# Patient Record
Sex: FEMALE | Race: WHITE | Employment: OTHER | URBAN - METROPOLITAN AREA
[De-identification: names, ages, dates, MRNs, and addresses within clinical notes are randomized per-mention and may not be internally consistent; named-entity substitution may affect disease eponyms.]

---

## 2017-08-07 PROBLEM — I74.2 RADIAL ARTERY THROMBOSIS (HCC): Status: ACTIVE | Noted: 2017-08-07

## 2017-08-10 ENCOUNTER — TELEPHONE (OUTPATIENT)
Dept: CARDIOLOGY CLINIC | Age: 57
End: 2017-08-10

## 2017-08-10 ENCOUNTER — OFFICE VISIT (OUTPATIENT)
Dept: CARDIOLOGY CLINIC | Age: 57
End: 2017-08-10

## 2017-08-10 VITALS
DIASTOLIC BLOOD PRESSURE: 84 MMHG | SYSTOLIC BLOOD PRESSURE: 124 MMHG | BODY MASS INDEX: 29.98 KG/M2 | WEIGHT: 191 LBS | HEART RATE: 88 BPM | HEIGHT: 67 IN

## 2017-08-10 DIAGNOSIS — I35.0 SEVERE AORTIC STENOSIS: Primary | ICD-10-CM

## 2017-08-10 PROCEDURE — 99214 OFFICE O/P EST MOD 30 MIN: CPT | Performed by: INTERNAL MEDICINE

## 2017-08-15 ENCOUNTER — HOSPITAL ENCOUNTER (OUTPATIENT)
Dept: WOMENS IMAGING | Age: 57
Discharge: HOME | End: 2017-08-15
Attending: INTERNAL MEDICINE | Admitting: INTERNAL MEDICINE

## 2017-08-15 DIAGNOSIS — Z12.31 VISIT FOR SCREENING MAMMOGRAM: ICD-10-CM

## 2017-08-24 ENCOUNTER — TELEPHONE (OUTPATIENT)
Dept: CARDIOLOGY CLINIC | Age: 57
End: 2017-08-24

## 2017-08-24 DIAGNOSIS — I35.0 SEVERE AORTIC STENOSIS: Primary | ICD-10-CM

## 2017-08-28 LAB
BASOPHILS ABSOLUTE: NORMAL /ΜL
BASOPHILS RELATIVE PERCENT: NORMAL %
BUN BLDV-MCNC: 11 MG/DL
CALCIUM SERPL-MCNC: 9.5 MG/DL
CHLORIDE BLD-SCNC: 101 MMOL/L
CO2: 24 MMOL/L
CREAT SERPL-MCNC: 0.8 MG/DL
EOSINOPHILS ABSOLUTE: NORMAL /ΜL
EOSINOPHILS RELATIVE PERCENT: NORMAL %
GFR CALCULATED: NORMAL
GLUCOSE BLD-MCNC: NORMAL MG/DL
HCT VFR BLD CALC: 39.6 % (ref 36–46)
HEMOGLOBIN: 13.6 G/DL (ref 12–16)
LYMPHOCYTES ABSOLUTE: NORMAL /ΜL
LYMPHOCYTES RELATIVE PERCENT: NORMAL %
MCH RBC QN AUTO: 31.6 PG
MCHC RBC AUTO-ENTMCNC: 34.3 G/DL
MCV RBC AUTO: 92.1 FL
MONOCYTES ABSOLUTE: NORMAL /ΜL
MONOCYTES RELATIVE PERCENT: NORMAL %
NEUTROPHILS ABSOLUTE: NORMAL /ΜL
NEUTROPHILS RELATIVE PERCENT: NORMAL %
PLATELET # BLD: 212 K/ΜL
PMV BLD AUTO: 10.3 FL
POTASSIUM SERPL-SCNC: 4.3 MMOL/L
RBC # BLD: 4.3 10^6/ΜL
SODIUM BLD-SCNC: 142 MMOL/L
WBC # BLD: 5.13 10^3/ML

## 2017-09-28 ENCOUNTER — TELEPHONE (OUTPATIENT)
Dept: CARDIOLOGY CLINIC | Age: 57
End: 2017-09-28

## 2017-09-28 DIAGNOSIS — Z95.2 S/P TAVR (TRANSCATHETER AORTIC VALVE REPLACEMENT): Primary | ICD-10-CM

## 2017-09-28 NOTE — TELEPHONE ENCOUNTER
Spoke with patient, she is scheduled for F/U from recent TAVR at [facility] with Dr. Josh Aldrich on 10/20/17. We will schedule patient to have follow up Echo and Labwork at that time.

## 2017-10-05 ENCOUNTER — OFFICE VISIT (OUTPATIENT)
Dept: CARDIOLOGY CLINIC | Age: 57
End: 2017-10-05

## 2017-10-05 VITALS
HEART RATE: 74 BPM | SYSTOLIC BLOOD PRESSURE: 126 MMHG | BODY MASS INDEX: 30.64 KG/M2 | WEIGHT: 195.2 LBS | HEIGHT: 67 IN | DIASTOLIC BLOOD PRESSURE: 82 MMHG

## 2017-10-05 DIAGNOSIS — Z98.890 POST-OPERATIVE STATE: Primary | ICD-10-CM

## 2017-10-05 PROCEDURE — 93000 ELECTROCARDIOGRAM COMPLETE: CPT | Performed by: INTERNAL MEDICINE

## 2017-10-05 PROCEDURE — 99214 OFFICE O/P EST MOD 30 MIN: CPT | Performed by: INTERNAL MEDICINE

## 2017-10-05 NOTE — MR AVS SNAPSHOT
After Visit Summary             Andrew Malhotra   10/5/2017 3:40 PM   Office Visit    Description:  Female : 1960   Provider:  Jacqueline Giron MD   Department:  Cardiology [address] and Future Appointments         Below is a list of your follow-up and future appointments. This may not be a complete list as you may have made appointments directly with providers that we are not aware of or your providers may have made some for you. Please call your providers to confirm appointments. It is important to keep your appointments. Please bring your current insurance card, photo ID, co-pay, and all medication bottles to your appointment. If self-pay, payment is expected at the time of service. Your To-Do List     Future Appointments Provider Department Dept Phone    2017 11:10 AM Tish Nam MD Cardiology [facility] [phone]    Please arrive 15 minutes prior to appointment, bring photo ID and insurance card. 2018 10:40 AM Jacqueline Giron MD Cardiology [facility] 104 [phone]    Please arrive 15 minutes prior to appointment, bring photo ID and insurance card. Standing Orders Interval Expires    EKG 12 Lead [EKG1 Custom]  Every 6 Months until 10/5/2018 10/5/2018    Follow-Up    Return in about 6 months (around 2018). Information from Your Visit        Department     Name Address Phone Fax    Cardiology [facility] [address] [address] [phone]      You Were Seen for:         Comments    Post-operative state   [619561]         Vital Signs     Blood Pressure Pulse Height Weight Body Mass Index Smoking Status    126/82 74 5' 7\" (1.702 m) 195 lb 3.2 oz (88.5 kg) 30.57 kg/m2 Current Every Day Smoker      Additional Information about your Body Mass Index (BMI)           Your BMI as listed above is considered obese (30 or more).  BMI is an estimate of body fat, calculated from your height and weight. The higher your BMI, the greater your risk of heart disease, high blood pressure, type 2 diabetes, stroke, gallstones, arthritis, sleep apnea, and certain cancers. BMI is not perfect. It may overestimate body fat in athletes and people who are more muscular. Even a small weight loss (between 5 and 10 percent of your current weight) by decreasing your calorie intake and becoming more physically active will help lower your risk of developing or worsening diseases associated with obesity. Learn more at: [employer]             Medications and Orders      Your Current Medications Are              apixaban (ELIQUIS) 5 MG TABS tablet Take 2 tablets by mouth 2 times daily for 7 days Take 2 tablets by mouth twice a day for 6 days, take 1 tablet by mouth twice a day for the next 5 days    amiodarone (CORDARONE) 200 MG tablet Take 200 mg by mouth 2 times daily    clopidogrel (PLAVIX) 75 MG tablet Take 75 mg by mouth daily    oxyCODONE-acetaminophen (PERCOCET)  MG per tablet Take 1 tablet by mouth every 4 hours as needed for Pain .    metoprolol succinate (TOPROL XL) 25 MG extended release tablet Take 0.5 tablets by mouth daily    GABAPENTIN PO Take 300 mg by mouth nightly     acetaminophen (TYLENOL) 325 MG tablet Take 650 mg by mouth every 6 hours as needed for Pain    mirtazapine (REMERON) 30 MG tablet Take 30 mg by mouth nightly    nicotine (NICODERM CQ) 21 MG/24HR Place 1 patch onto the skin every 24 hours    promethazine (PHENERGAN) 25 MG tablet Take 1 tablet by mouth every 6 hours as needed for Nausea for 15 doses. aspirin 81 MG EC tablet Take 1 tablet by mouth daily    imatinib (GLEEVEC) 400 MG tablet Take 400 mg by mouth daily. Allergies              Ampicillin Other (See Comments)    Vein start to burn and swelling.  Entire arm     Bee Venom Anaphylaxis    Penicillins Swelling, Rash Typhoid Vaccines Other (See Comments)    Pt cannot remember     Codeine Nausea And Vomiting    Morphine Nausea And Vomiting    Tape Libby Cake Tape] Rash    Vicodin [Hydrocodone-acetaminophen] Nausea And Vomiting      We Ordered/Performed the following           EKG 12 Lead          Result Summary for EKG 12 Lead      Result Information     Status          Final result (Resulted: 10/5/2017  3:35 PM)           10/5/2017  3:38 PM      Scans on Order [id number]            ECG on 10/5/2017  3:38 PM : ECG Report                     Additional Information        Basic Information     Date Of Birth Sex Race Ethnicity Preferred Language    1960 Female White Non-/Non  English      Problem List as of 10/5/2017  Date Reviewed: 2/19/2016                Right radial artery thrombus (HCC)    Chest pain    Rheumatic aortic stenosis    Radial artery thrombosis (HCC)    Pneumothorax    Pneumonia    Nausea & vomiting    Abdominal pain    CML (chronic myeloid leukemia) ([facility] [address]      Immunizations as of 10/5/2017     Name Date    Pneumococcal Polysaccharide ([id number]) 4/15/2014      Preventive Care        Date Due    Hepatitis C screening is recommended for all adults regardless of risk factors born between [facility] at least once (lifetime) who have never been tested. 1960    HIV screening is recommended for all people regardless of risk factors  aged 15-65 years at least once (lifetime) who have never been HIV tested.  12/4/1977    Tetanus Combination Vaccine (1 - Tdap) 12/4/1981    Pap Smear 12/4/1983    Cholesterol Screening 12/4/2002    Colon Cancer Stool Test 9/26/2013    Pneumococcal Vaccines (two) for adults aged 19-64  years who are immunocompromised or whose spleen is missing or not working  (2 of 3 - PCV13) 4/15/2015    Yearly Flu Vaccine (1) 9/1/2017    Diabetes Screening 2/14/2019    Mammograms are recommended every 2 years for low/average risk patients aged 48 - 69, and every year for high risk patients per updated national guidelines. However these guidelines can be individualized by your provider. 8/15/2019            MyChart Signup           Our records indicate that you have declined MyChart signup.

## 2017-10-05 NOTE — PROGRESS NOTES
Angelica Hutchison MD        OFFICE  FOLLOWUP NOTE    Chief complaints: patient is here for management of  severe AS s/p TAVR, Lukemia, Lung mass s/p resection 2/2015, LEFT fore arm thrombus    Subjective: patient feels better, no chest pain, no shortness of breath, no dizziness, no palpitations    HPI Lamar Alexander is a 49 y. o.year old who  has a past medical history of Aortic valve disease; CML (chronic myelocytic leukemia) ([facility] [address] H/O cardiac catheterization; Leukemia ([facility] [address] Leukemia ([facility] [address] Lung mass; and Ovarian cancer ([facility] [address] and presents for management of severe AS s/p TAVR, Lukemia, Lung mass s/p resection 2/2015, LEFT fore arm thrombuswhich are well controlled      Current Outpatient Prescriptions   Medication Sig Dispense Refill    apixaban (ELIQUIS) 5 MG TABS tablet Take 2 tablets by mouth 2 times daily for 7 days Take 2 tablets by mouth twice a day for 6 days, take 1 tablet by mouth twice a day for the next 5 days 28 tablet 0    amiodarone (CORDARONE) 200 MG tablet Take 200 mg by mouth 2 times daily      clopidogrel (PLAVIX) 75 MG tablet Take 75 mg by mouth daily      oxyCODONE-acetaminophen (PERCOCET)  MG per tablet Take 1 tablet by mouth every 4 hours as needed for Pain .  metoprolol succinate (TOPROL XL) 25 MG extended release tablet Take 0.5 tablets by mouth daily (Patient taking differently: Take 25 mg by mouth daily ) 90 tablet 3    GABAPENTIN PO Take 300 mg by mouth nightly       acetaminophen (TYLENOL) 325 MG tablet Take 650 mg by mouth every 6 hours as needed for Pain      mirtazapine (REMERON) 30 MG tablet Take 30 mg by mouth nightly      nicotine (NICODERM CQ) 21 MG/24HR Place 1 patch onto the skin every 24 hours      promethazine (PHENERGAN) 25 MG tablet Take 1 tablet by mouth every 6 hours as needed for Nausea for 15 doses. 15 tablet 0    aspirin 81 MG EC tablet Take 1 tablet by mouth daily 90 tablet 3    imatinib (GLEEVEC) 400 MG tablet Take 400 mg by mouth daily. No current facility-administered medications for this visit. Allergies: Ampicillin; Bee venom; Penicillins; Typhoid vaccines; Codeine; Morphine; Tape [adhesive tape]; and Vicodin [hydrocodone-acetaminophen]  Past Medical History:   Diagnosis Date    Aortic valve disease     CML (chronic myelocytic leukemia) ([facility] [address]     H/O cardiac catheterization 08/04/2017    normal arteries, severe AS    Leukemia (HCC)     Leukemia ([facility] [address]     Lung mass     Ovarian cancer ([facility]      Past Surgical History:   Procedure Laterality Date    ANOMALOUS VENOUS RETURN REPAIR      BONE MARROW BIOPSY  feb 2012    CHOLECYSTECTOMY      DENTAL SURGERY      HYSTERECTOMY      THORACOTOMY       Family History   Problem Relation Age of Onset    Cancer Mother     Kidney Disease Mother     Heart Disease Maternal Grandmother      Social History   Substance Use Topics    Smoking status: Current Every Day Smoker     Packs/day: 0.25     Years: 38.00    Smokeless tobacco: Never Used    Alcohol use No      [unfilled]  Review of Systems:   · Constitutional: No Fever or Weight Loss   · Eyes: No Decreased Vision  · ENT: No Headaches, Hearing Loss or Vertigo  · Cardiovascular: No chest pain, dyspnea on exertion, palpitations or loss of consciousness  · Respiratory: No cough or wheezing    · Gastrointestinal: No abdominal pain, appetite loss, blood in stools, constipation, diarrhea or heartburn  · Genitourinary: No dysuria, trouble voiding, or hematuria  · Musculoskeletal:  No gait disturbance, weakness or joint complaints  · Integumentary: No rash or pruritis  · Neurological: No TIA or stroke symptoms  · Psychiatric: No anxiety or depression  · Endocrine: No malaise, fatigue or temperature intolerance  · Hematologic/Lymphatic: No bleeding problems, blood clots or swollen lymph nodes  · Allergic/Immunologic: No nasal congestion or hives  All systems negative except as marked.    Objective:  /82  Pulse 74  Ht 5' 7\" (1.702 m)  Wt 09/26/2012     PT/INR:  No results found for: PTINR  No results found for: LABA1C  No results found for: CHOL, TRIG, HDL, LDLCALC, LDLDIRECT  Lab Results   Component Value Date    ALT 36 09/27/2017    AST 24 09/27/2017     TSH:  No results found for: TSH    Ekg: nsr  Impression:  Varun Jorgensen is a 49 y. o.year old who  has a past medical history of Aortic valve disease; CML (chronic myelocytic leukemia) ([facility] [address] H/O cardiac catheterization; Leukemia ([facility] [address] Leukemia ([facility] [address] Lung mass; and Ovarian cancer ([facility] [address] and presents with     Plan:  1. SEVERE AS. S/P TAVR: DOING GREAT, continue to follow up Dr. Sharon Cazares, will need CXR ( hospital records from [place] discussed)  2. Lukemia: on gleevec   3. Lung mass: s/p surgery ( not malignant, she had rt lung mass and had 4 times pneumothorax)  4. left basilic vein thrombophlebitis:continue eliquis, continue warm compression and aspirin  5. Rt femoral artery wound his healing  6. Health maintenance: exerise and diet  All labs, medications and tests reviewed, continue all other medications of all above medical condition listed as is.     [unfilled]

## 2017-10-16 ENCOUNTER — TELEPHONE (OUTPATIENT)
Dept: CARDIOLOGY CLINIC | Age: 57
End: 2017-10-16

## 2017-10-16 RX ORDER — AMIODARONE HYDROCHLORIDE 200 MG/1
200 TABLET ORAL 2 TIMES DAILY
Qty: 180 TABLET | Refills: 3 | Status: SHIPPED | OUTPATIENT
Start: 2017-10-16 | End: 2017-11-02

## 2017-10-16 RX ORDER — CLOPIDOGREL BISULFATE 75 MG/1
75 TABLET ORAL DAILY
Qty: 90 TABLET | Refills: 3 | Status: SHIPPED | OUTPATIENT
Start: 2017-10-16 | End: 2022-10-31

## 2017-10-16 NOTE — TELEPHONE ENCOUNTER
Pt called and needs written scripts to take to the [place]  clopidogerl 75 mg 1 time a day   Amiodarone 200 mg 1 tab 2 times a day she has a week left, please call so she can

## 2017-10-20 ENCOUNTER — TELEPHONE (OUTPATIENT)
Dept: CARDIOLOGY CLINIC | Age: 57
End: 2017-10-20

## 2017-10-20 ENCOUNTER — NURSE ONLY (OUTPATIENT)
Dept: CARDIOLOGY CLINIC | Age: 57
End: 2017-10-20

## 2017-10-20 VITALS
SYSTOLIC BLOOD PRESSURE: 138 MMHG | HEART RATE: 76 BPM | BODY MASS INDEX: 31.2 KG/M2 | DIASTOLIC BLOOD PRESSURE: 82 MMHG | HEIGHT: 67 IN | WEIGHT: 198.8 LBS

## 2017-10-20 DIAGNOSIS — Z98.890 POST-OPERATIVE STATE: ICD-10-CM

## 2017-10-20 PROCEDURE — 93000 ELECTROCARDIOGRAM COMPLETE: CPT | Performed by: INTERNAL MEDICINE

## 2017-10-20 RX ORDER — IMATINIB MESYLATE 400 MG/1
TABLET, FILM COATED ORAL
COMMUNITY
End: 2017-10-20

## 2017-10-20 NOTE — TELEPHONE ENCOUNTER
Per Dr. Geovanna Man, patient should come in to office for an EKG and possible 48 hour holter monitor.

## 2017-10-24 DIAGNOSIS — Z95.2 S/P TAVR (TRANSCATHETER AORTIC VALVE REPLACEMENT): Primary | ICD-10-CM

## 2017-10-27 ENCOUNTER — HOSPITAL ENCOUNTER (OUTPATIENT)
Dept: GENERAL RADIOLOGY | Age: 57
Discharge: HOME | End: 2017-10-27
Attending: INTERNAL MEDICINE | Admitting: INTERNAL MEDICINE

## 2017-10-27 ENCOUNTER — PROCEDURE VISIT (OUTPATIENT)
Dept: CARDIOLOGY CLINIC | Age: 57
End: 2017-10-27

## 2017-10-27 ENCOUNTER — HOSPITAL ENCOUNTER (OUTPATIENT)
Dept: OTHER | Age: 57
Discharge: HOME | End: 2017-10-31
Attending: INTERNAL MEDICINE | Admitting: INTERNAL MEDICINE

## 2017-10-27 ENCOUNTER — HOSPITAL ENCOUNTER (OUTPATIENT)
Dept: GENERAL RADIOLOGY | Age: 57
Discharge: HOME | End: 2017-10-27

## 2017-10-27 DIAGNOSIS — I35.0 AORTIC VALVE STENOSIS, ETIOLOGY OF CARDIAC VALVE DISEASE UNSPECIFIED: ICD-10-CM

## 2017-10-27 DIAGNOSIS — Z95.2 S/P TAVR (TRANSCATHETER AORTIC VALVE REPLACEMENT): ICD-10-CM

## 2017-10-27 LAB
ANION GAP SERPL CALCULATED.3IONS-SCNC: 13 MMOL/L (ref 4–16)
BUN BLDV-MCNC: 16 MG/DL (ref 6–23)
CALCIUM SERPL-MCNC: 9.1 MG/DL (ref 8.3–10.6)
CHLORIDE BLD-SCNC: 99 MMOL/L (ref 99–110)
CO2: 29 MMOL/L (ref 21–32)
CREAT SERPL-MCNC: 1.7 MG/DL (ref 0.6–1.1)
GFR AFRICAN AMERICAN: 38 ML/MIN/1.73M2
GFR NON-AFRICAN AMERICAN: 31 ML/MIN/1.73M2
GLUCOSE BLD-MCNC: 80 MG/DL (ref 70–140)
HCT VFR BLD CALC: 40.8 % (ref 37–47)
HEMOGLOBIN: 13.5 GM/DL (ref 12.5–16)
LV EF: 55 %
LVEF MODALITY: NORMAL
MCH RBC QN AUTO: 30.9 PG (ref 27–31)
MCHC RBC AUTO-ENTMCNC: 33.1 % (ref 32–36)
MCV RBC AUTO: 93.4 FL (ref 78–100)
PDW BLD-RTO: 14.5 % (ref 11.7–14.9)
PLATELET # BLD: 203 K/CU MM (ref 140–440)
PMV BLD AUTO: 10 FL (ref 7.5–11.1)
POTASSIUM SERPL-SCNC: 4.3 MMOL/L (ref 3.5–5.1)
PRO-BNP: 326.9 PG/ML
RBC # BLD: 4.37 M/CU MM (ref 4.2–5.4)
SODIUM BLD-SCNC: 141 MMOL/L (ref 135–145)
WBC # BLD: 7.1 K/CU MM (ref 4–10.5)

## 2017-10-27 PROCEDURE — 93306 TTE W/DOPPLER COMPLETE: CPT | Performed by: INTERNAL MEDICINE

## 2017-10-27 NOTE — PROGRESS NOTES
Subjective:      Patient ID: Marianne Roth is a 49 y.o. female.     HPI    Review of Systems    Objective:   Physical Exam    Assessment:      ***      Plan:      ***

## 2017-11-01 ENCOUNTER — HOSPITAL ENCOUNTER (OUTPATIENT)
Dept: OTHER | Age: 57
Discharge: HOME | End: 2017-11-30
Attending: INTERNAL MEDICINE | Admitting: INTERNAL MEDICINE

## 2017-11-02 ENCOUNTER — OFFICE VISIT (OUTPATIENT)
Dept: CARDIOLOGY CLINIC | Age: 57
End: 2017-11-02

## 2017-11-02 VITALS
HEART RATE: 60 BPM | BODY MASS INDEX: 31.67 KG/M2 | SYSTOLIC BLOOD PRESSURE: 120 MMHG | DIASTOLIC BLOOD PRESSURE: 84 MMHG | HEIGHT: 67 IN | WEIGHT: 201.8 LBS

## 2017-11-02 DIAGNOSIS — Z95.2 S/P TAVR (TRANSCATHETER AORTIC VALVE REPLACEMENT): Primary | ICD-10-CM

## 2017-11-02 DIAGNOSIS — I35.0 SEVERE AORTIC STENOSIS: ICD-10-CM

## 2017-11-02 PROCEDURE — 99213 OFFICE O/P EST LOW 20 MIN: CPT | Performed by: INTERNAL MEDICINE

## 2017-11-02 NOTE — PROGRESS NOTES
CARDIOLOGY NOTE      11/2/2017    RE: Nikky Moralez  (1960)                               TO:  Dr. Brian Albertsbury you for involving me in taking care of your  patient Nikky Moralez, who is a  49y.o. year old      female with past medical  history of  SP  TAVR  is  seen today Patient  during this  visit has no cardiac complains  Is back on chemo. Vitals:    11/02/17 1319   BP: 120/84   Pulse: 60       Current Outpatient Prescriptions   Medication Sig Dispense Refill    clopidogrel (PLAVIX) 75 MG tablet Take 1 tablet by mouth daily 90 tablet 3    oxyCODONE-acetaminophen (PERCOCET)  MG per tablet Take 1 tablet by mouth every 4 hours as needed for Pain .  aspirin 81 MG EC tablet Take 1 tablet by mouth daily 90 tablet 3    metoprolol succinate (TOPROL XL) 25 MG extended release tablet Take 0.5 tablets by mouth daily (Patient taking differently: Take 25 mg by mouth daily ) 90 tablet 3    GABAPENTIN PO Take 300 mg by mouth nightly       acetaminophen (TYLENOL) 325 MG tablet Take 650 mg by mouth every 6 hours as needed for Pain      mirtazapine (REMERON) 30 MG tablet Take 30 mg by mouth nightly      promethazine (PHENERGAN) 25 MG tablet Take 1 tablet by mouth every 6 hours as needed for Nausea for 15 doses. 15 tablet 0    imatinib (GLEEVEC) 400 MG tablet Take 400 mg by mouth daily.  amiodarone (CORDARONE) 200 MG tablet Take 1 tablet by mouth 2 times daily 180 tablet 3    nicotine (NICODERM CQ) 21 MG/24HR Place 1 patch onto the skin every 24 hours       No current facility-administered medications for this visit. Allergies: Ampicillin; Bee venom; Penicillins; Typhoid vaccines;  Codeine; Morphine; Tape [adhesive tape]; and Vicodin [hydrocodone-acetaminophen]  Past Medical History:   Diagnosis Date    Aortic valve disease     CML (chronic myelocytic leukemia) ([facility] [address]     H/O cardiac catheterization 08/04/2017    normal arteries, severe AS    Leukemia ([facility] [address]     Leukemia ([facility] [address]     Lung mass     Ovarian cancer [facility])      Past Surgical History:   Procedure Laterality Date    AORTIC VALVE REPLACEMENT  09/25/2017    [place]    BONE MARROW BIOPSY  feb 2012    CHOLECYSTECTOMY      DENTAL SURGERY      HYSTERECTOMY      THORACOTOMY       Family History   Problem Relation Age of Onset    Cancer Mother     Kidney Disease Mother     Heart Disease Maternal Grandmother      Social History   Substance Use Topics    Smoking status: Current Every Day Smoker     Packs/day: 0.25     Years: 38.00    Smokeless tobacco: Never Used    Alcohol use No          Review of systems:  HEENT: Neg  Card:no   GI;Neg  : Neg  Neuro: Neg  Psych: Neg  Derm: Neg  MS; Neg  All: Documented  Constitutional: Neg    Objective:      Physical Exam:  /84   Pulse 60   Ht 5' 7\" (1.702 m)   Wt 201 lb 12.8 oz (91.5 kg)   BMI 31.61 kg/m²   Wt Readings from Last 3 Encounters:   11/02/17 201 lb 12.8 oz (91.5 kg)   10/20/17 198 lb 12.8 oz (90.2 kg)   10/05/17 195 lb 3.2 oz (88.5 kg)     Body mass index is 31.61 kg/m². GENERAL - Alert, oriented, pleasant, in no apparent distress. Head unremarkable  Eyes  Not injected conjunctiva  ENT  normal mucosa  Neck - Supple. No jugular venous distention noted. No carotid bruits. Cardiovascular  Normal S1 and S2 without obvious murmur or gallop. Extremities - No cyanosis, clubbing, or significant edema. Pulmonary  No respiratory distress. No wheezes or rales. Pulses: Bilateral radial and pedal pulses normal  Abdomen  no tenderness  Musculoskeletal  normal strength  Neurologic    There are  no gross focal neurologic abnormalities.   Skin-  No rash  Affect; normal mood    DATA:  Lab Results   Component Value Date    TROPONINI 0.007 09/27/2012    TROPONINI <0.006 05/25/2012     BNP:    Lab Results   Component Value Date    BNP 43 09/26/2012     PT/INR:  No results found for: PTINR  No results found for: LABA1C  No results found for:

## 2017-11-03 ENCOUNTER — TELEPHONE (OUTPATIENT)
Dept: CARDIOLOGY CLINIC | Age: 57
End: 2017-11-03

## 2017-12-12 ENCOUNTER — TELEPHONE (OUTPATIENT)
Dept: CARDIOLOGY CLINIC | Age: 57
End: 2017-12-12

## 2017-12-12 NOTE — TELEPHONE ENCOUNTER
Maya Madrigal with Cardiac Rehab FYI patient is doing in [address] her insurance would not pay for there rehab

## 2018-04-03 ENCOUNTER — TELEPHONE (OUTPATIENT)
Dept: CARDIOLOGY CLINIC | Age: 58
End: 2018-04-03

## 2018-05-10 ENCOUNTER — HOSPITAL ENCOUNTER (OUTPATIENT)
Dept: CARDIOLOGY | Age: 58
Discharge: HOME | End: 2018-05-10
Attending: INTERNAL MEDICINE | Admitting: INTERNAL MEDICINE

## 2018-05-10 DIAGNOSIS — I35.0 NONRHEUMATIC AORTIC VALVE STENOSIS: ICD-10-CM

## 2018-05-10 LAB
LV EF: 53 %
LVEF MODALITY: NORMAL

## 2018-05-24 ENCOUNTER — OFFICE VISIT (OUTPATIENT)
Dept: CARDIOLOGY CLINIC | Age: 58
End: 2018-05-24

## 2018-05-24 VITALS
HEART RATE: 74 BPM | HEIGHT: 67 IN | WEIGHT: 211 LBS | DIASTOLIC BLOOD PRESSURE: 84 MMHG | BODY MASS INDEX: 33.12 KG/M2 | SYSTOLIC BLOOD PRESSURE: 128 MMHG

## 2018-05-24 DIAGNOSIS — Z95.2 S/P TAVR (TRANSCATHETER AORTIC VALVE REPLACEMENT): Primary | ICD-10-CM

## 2018-05-24 PROCEDURE — 99213 OFFICE O/P EST LOW 20 MIN: CPT | Performed by: INTERNAL MEDICINE

## 2018-10-03 ENCOUNTER — TELEPHONE (OUTPATIENT)
Dept: CARDIOLOGY CLINIC | Age: 58
End: 2018-10-03

## 2018-10-03 NOTE — TELEPHONE ENCOUNTER
Per telephone request on 10/3/18,  OV note faxed to the [facility] to [phone].

## 2018-10-03 NOTE — TELEPHONE ENCOUNTER
Myla Client from the [place] asked for the patients post TAVR notes to be faxed over to her with attention Myla Client.  Fax number [phone]

## 2022-04-19 ENCOUNTER — INITIAL CONSULT (OUTPATIENT)
Dept: CARDIOLOGY CLINIC | Age: 62
End: 2022-04-19
Payer: OTHER GOVERNMENT

## 2022-04-19 VITALS
SYSTOLIC BLOOD PRESSURE: 138 MMHG | HEIGHT: 66 IN | WEIGHT: 197.2 LBS | BODY MASS INDEX: 31.69 KG/M2 | DIASTOLIC BLOOD PRESSURE: 88 MMHG | HEART RATE: 77 BPM

## 2022-04-19 DIAGNOSIS — R06.02 SOB (SHORTNESS OF BREATH): Primary | ICD-10-CM

## 2022-04-19 PROCEDURE — 93000 ELECTROCARDIOGRAM COMPLETE: CPT | Performed by: INTERNAL MEDICINE

## 2022-04-19 PROCEDURE — 99204 OFFICE O/P NEW MOD 45 MIN: CPT | Performed by: INTERNAL MEDICINE

## 2022-04-19 RX ORDER — FUROSEMIDE 20 MG/1
20 TABLET ORAL
Status: ON HOLD | COMMUNITY
Start: 2021-12-16 | End: 2022-11-02 | Stop reason: HOSPADM

## 2022-04-19 RX ORDER — PAROXETINE 30 MG/1
30 TABLET, FILM COATED ORAL
COMMUNITY
Start: 2022-02-02

## 2022-04-19 NOTE — PROGRESS NOTES
CARDIOLOGY NOTE      4/19/2022    RE: Martinez Herman  (1960)                               TO:  Dr. Felipe Clancy saw <3 yrs ago      Beau Madrid is a 63 y.o. female who was seen today for management of valvular heart disease                                    HPI:                   Pt has h/o valvular heart disease, status post TAVR, seen today for  fu. Pt has  Moderate exertional sob getting worse, has no CP, status has been getting worse  Also told that he has kidney issues as well  TAVR was done given that she has severe aortic stenosis and had leukemia was deemed high risk for surgical    Martinez Herman has the following history recorded in care path:  Patient Active Problem List    Diagnosis Date Noted    Right radial artery thrombus (HCC)     Chest pain     Rheumatic aortic stenosis     Radial artery thrombosis (HCC) 08/07/2017    Pneumothorax 02/19/2016    Pneumonia 09/26/2012    Nausea & vomiting 09/26/2012    Abdominal pain 09/26/2012    CML (chronic myeloid leukemia) ([facility] [address] 09/26/2012     Current Outpatient Medications   Medication Sig Dispense Refill    BUSPIRONE HCL PO 20 mg      furosemide (LASIX) 20 MG tablet 20 mg      PARoxetine (PAXIL) 30 MG tablet 30 mg      oxyCODONE-acetaminophen (PERCOCET)  MG per tablet Take 1 tablet by mouth every 4 hours as needed for Pain .       aspirin 81 MG EC tablet Take 1 tablet by mouth daily 90 tablet 3    metoprolol succinate (TOPROL XL) 25 MG extended release tablet Take 0.5 tablets by mouth daily (Patient taking differently: Take 25 mg by mouth daily ) 90 tablet 3    GABAPENTIN PO Take 300 mg by mouth nightly       acetaminophen (TYLENOL) 325 MG tablet Take 650 mg by mouth every 6 hours as needed for Pain      mirtazapine (REMERON) 30 MG tablet Take 30 mg by mouth nightly      nicotine (NICODERM CQ) 21 MG/24HR Place 1 patch onto the skin every 24 hours      imatinib (GLEEVEC) 400 MG tablet Take 400 mg distress. EYES: No jaundice, no conjunctival pallor. SKIN: It is warm & dry. No rashes. No Echhymosis    HEENT - No clinically significant abnormalities seen. Neck - Supple. No jugular venous distention noted. No carotid bruits. Cardiovascular - Normal S1 and S2 without obvious murmur or gallop. Extremities - No cyanosis, clubbing, or significant edema. Pulmonary - No respiratory distress. No wheezes or rales. Abdomen - No masses, tenderness, or organomegaly. Musculoskeletal - No significant edema. No joint deformities. No muscle wasting. Neurologic - Cranial nerves II through XII are grossly intact. There were no gross focal neurologic abnormalities.     Lab Review   Lab Results   Component Value Date    TROPONINT <0.010 08/06/2017     BNP:    Lab Results   Component Value Date    BNP 43 09/26/2012     PT/INR:    Lab Results   Component Value Date    INR 1.01 09/28/2017     No results found for: LABA1C  Lab Results   Component Value Date    WBC 7.1 10/27/2017    HCT 40.8 10/27/2017    MCV 93.4 10/27/2017     10/27/2017     No results found for: CHOL, TRIG, HDL, LDLCALC, LDLDIRECT, CHOLHDLRATIO  Lab Results   Component Value Date    ALT 36 09/27/2017    AST 24 09/27/2017     BMP:    Lab Results   Component Value Date     10/27/2017    K 4.3 10/27/2017    CL 99 10/27/2017    CO2 29 10/27/2017    BUN 16 10/27/2017    CREATININE 1.7 10/27/2017     CMP:   Lab Results   Component Value Date     10/27/2017    K 4.3 10/27/2017    CL 99 10/27/2017    CO2 29 10/27/2017    BUN 16 10/27/2017    PROT 6.7 09/27/2017    PROT 6.9 09/26/2012     TSH:  No results found for: TSH, TSHHS        Assessment & Plan:    -Valvular heart disease, status post TAVR for aortic stenosis  Check echo  Patient getting short of breath we will check the status of the TAVR  Also will check the labs given that she still has leukemia if severely anemic can cause shortness of breath as well    -PAF and Toprol-XL 25 mg p.o. daily and regular rate and rhythm  In SR on EKG today    -Patient is on [address] for leukemia being managed by oncology    - CKD  Had high creat  Check met profile with PCP  To get the results to PCP    Mary Yen MD    [facility]    Please note this report has been partially produced using speech recognition software and may contain errors related to that system including errors in grammar, punctuation, and spelling, as well as words and phrases that may be inappropriate. If there are any questions or concerns please feel free to contact the dictating provider for clarification.

## 2022-04-22 ENCOUNTER — PROCEDURE VISIT (OUTPATIENT)
Dept: CARDIOLOGY CLINIC | Age: 62
End: 2022-04-22
Payer: OTHER GOVERNMENT

## 2022-04-22 DIAGNOSIS — R06.02 SOB (SHORTNESS OF BREATH): ICD-10-CM

## 2022-04-22 LAB
LV EF: 58 %
LVEF MODALITY: NORMAL

## 2022-04-22 PROCEDURE — 93306 TTE W/DOPPLER COMPLETE: CPT | Performed by: INTERNAL MEDICINE

## 2022-04-25 ENCOUNTER — TELEPHONE (OUTPATIENT)
Dept: CARDIOLOGY CLINIC | Age: 62
End: 2022-04-25

## 2022-04-25 NOTE — TELEPHONE ENCOUNTER
Left ventricular function is normal, EF 55-60%. Grade II diastolic dysfunction. Mild concentric left ventricular hypertrophy. Mildly dilated left atrium. Normally function TAVR with a mean gradient of 11 mmHg. Mildly elevated pulmonary artery pressure, RVSP 35 mmHg. Mildly dilated IVC. No evidence of any pericardial effusion.        Results given to the patient

## 2022-10-31 ENCOUNTER — APPOINTMENT (OUTPATIENT)
Dept: GENERAL RADIOLOGY | Age: 62
DRG: 871 | End: 2022-10-31
Payer: OTHER GOVERNMENT

## 2022-10-31 ENCOUNTER — HOSPITAL ENCOUNTER (INPATIENT)
Age: 62
LOS: 2 days | Discharge: HOME HEALTH | DRG: 871 | End: 2022-11-02
Attending: HOSPITALIST | Admitting: HOSPITALIST
Payer: OTHER GOVERNMENT

## 2022-10-31 DIAGNOSIS — R77.8 ELEVATED TROPONIN: ICD-10-CM

## 2022-10-31 DIAGNOSIS — J18.9 PNEUMONIA OF BOTH LOWER LOBES DUE TO INFECTIOUS ORGANISM: Primary | ICD-10-CM

## 2022-10-31 DIAGNOSIS — R11.2 NAUSEA AND VOMITING, UNSPECIFIED VOMITING TYPE: ICD-10-CM

## 2022-10-31 DIAGNOSIS — J18.9 COMMUNITY ACQUIRED PNEUMONIA, BILATERAL: ICD-10-CM

## 2022-10-31 DIAGNOSIS — E87.6 HYPOKALEMIA: ICD-10-CM

## 2022-10-31 LAB
ADENOVIRUS DETECTION BY PCR: NOT DETECTED
ALBUMIN SERPL-MCNC: 4.3 GM/DL (ref 3.4–5)
ALP BLD-CCNC: 92 IU/L (ref 40–129)
ALT SERPL-CCNC: 17 U/L (ref 10–40)
ANION GAP SERPL CALCULATED.3IONS-SCNC: 11 MMOL/L (ref 4–16)
ANION GAP SERPL CALCULATED.3IONS-SCNC: 13 MMOL/L (ref 4–16)
AST SERPL-CCNC: 13 IU/L (ref 15–37)
BASOPHILS ABSOLUTE: 0 K/CU MM
BASOPHILS RELATIVE PERCENT: 0.2 % (ref 0–1)
BILIRUB SERPL-MCNC: 0.7 MG/DL (ref 0–1)
BORDETELLA PARAPERTUSSIS BY PCR: NOT DETECTED
BORDETELLA PERTUSSIS PCR: NOT DETECTED
BUN BLDV-MCNC: 27 MG/DL (ref 6–23)
BUN BLDV-MCNC: 31 MG/DL (ref 6–23)
CALCIUM SERPL-MCNC: 8.6 MG/DL (ref 8.3–10.6)
CALCIUM SERPL-MCNC: 8.9 MG/DL (ref 8.3–10.6)
CHLAMYDOPHILA PNEUMONIA PCR: NOT DETECTED
CHLORIDE BLD-SCNC: 95 MMOL/L (ref 99–110)
CHLORIDE BLD-SCNC: 97 MMOL/L (ref 99–110)
CO2: 26 MMOL/L (ref 21–32)
CO2: 27 MMOL/L (ref 21–32)
CORONAVIRUS 229E PCR: NOT DETECTED
CORONAVIRUS HKU1 PCR: NOT DETECTED
CORONAVIRUS NL63 PCR: NOT DETECTED
CORONAVIRUS OC43 PCR: NOT DETECTED
CREAT SERPL-MCNC: 1.4 MG/DL (ref 0.6–1.1)
CREAT SERPL-MCNC: 1.4 MG/DL (ref 0.6–1.1)
DIFFERENTIAL TYPE: ABNORMAL
EKG ATRIAL RATE: 163 BPM
EKG ATRIAL RATE: 94 BPM
EKG DIAGNOSIS: NORMAL
EKG DIAGNOSIS: NORMAL
EKG P AXIS: 57 DEGREES
EKG P AXIS: 75 DEGREES
EKG P-R INTERVAL: 130 MS
EKG Q-T INTERVAL: 480 MS
EKG Q-T INTERVAL: 518 MS
EKG QRS DURATION: 84 MS
EKG QRS DURATION: 86 MS
EKG QTC CALCULATION (BAZETT): 622 MS
EKG QTC CALCULATION (BAZETT): 647 MS
EKG R AXIS: -6 DEGREES
EKG R AXIS: 29 DEGREES
EKG T AXIS: 166 DEGREES
EKG T AXIS: 63 DEGREES
EKG VENTRICULAR RATE: 101 BPM
EKG VENTRICULAR RATE: 94 BPM
EOSINOPHILS ABSOLUTE: 0 K/CU MM
EOSINOPHILS RELATIVE PERCENT: 0.2 % (ref 0–3)
GFR SERPL CREATININE-BSD FRML MDRD: 43 ML/MIN/1.73M2
GFR SERPL CREATININE-BSD FRML MDRD: 43 ML/MIN/1.73M2
GLUCOSE BLD-MCNC: 122 MG/DL (ref 70–99)
GLUCOSE BLD-MCNC: 128 MG/DL (ref 70–99)
HCT VFR BLD CALC: 38.4 % (ref 37–47)
HEMOGLOBIN: 13.8 GM/DL (ref 12.5–16)
HUMAN METAPNEUMOVIRUS PCR: NOT DETECTED
IMMATURE NEUTROPHIL %: 0.7 % (ref 0–0.43)
INFLUENZA A BY PCR: ABNORMAL
INFLUENZA A H1 (2009) PCR: NOT DETECTED
INFLUENZA A H1 PANDEMIC PCR: NOT DETECTED
INFLUENZA A H3 PCR: ABNORMAL
INFLUENZA B BY PCR: NOT DETECTED
LIPASE: 23 IU/L (ref 13–60)
LYMPHOCYTES ABSOLUTE: 1.7 K/CU MM
LYMPHOCYTES RELATIVE PERCENT: 14.2 % (ref 24–44)
MAGNESIUM: 2.1 MG/DL (ref 1.8–2.4)
MAGNESIUM: 2.7 MG/DL (ref 1.8–2.4)
MCH RBC QN AUTO: 31.4 PG (ref 27–31)
MCHC RBC AUTO-ENTMCNC: 35.9 % (ref 32–36)
MCV RBC AUTO: 87.3 FL (ref 78–100)
MONOCYTES ABSOLUTE: 0.7 K/CU MM
MONOCYTES RELATIVE PERCENT: 5.6 % (ref 0–4)
MYCOPLASMA PNEUMONIAE PCR: NOT DETECTED
NUCLEATED RBC %: 0 %
PARAINFLUENZA 1 PCR: NOT DETECTED
PARAINFLUENZA 2 PCR: NOT DETECTED
PARAINFLUENZA 3 PCR: NOT DETECTED
PARAINFLUENZA 4 PCR: NOT DETECTED
PDW BLD-RTO: 13.5 % (ref 11.7–14.9)
PLATELET # BLD: 178 K/CU MM (ref 140–440)
PMV BLD AUTO: 10.8 FL (ref 7.5–11.1)
POTASSIUM SERPL-SCNC: 2.3 MMOL/L (ref 3.5–5.1)
POTASSIUM SERPL-SCNC: 2.7 MMOL/L (ref 3.5–5.1)
PRO-BNP: 1199 PG/ML
PROCALCITONIN: 0.18
RAPID INFLUENZA  B AGN: NEGATIVE
RAPID INFLUENZA A AGN: NEGATIVE
RBC # BLD: 4.4 M/CU MM (ref 4.2–5.4)
RHINOVIRUS ENTEROVIRUS PCR: NOT DETECTED
RSV PCR: NOT DETECTED
SARS-COV-2, NAAT: NOT DETECTED
SARS-COV-2: NOT DETECTED
SEGMENTED NEUTROPHILS ABSOLUTE COUNT: 9.5 K/CU MM
SEGMENTED NEUTROPHILS RELATIVE PERCENT: 79.1 % (ref 36–66)
SODIUM BLD-SCNC: 134 MMOL/L (ref 135–145)
SODIUM BLD-SCNC: 135 MMOL/L (ref 135–145)
TOTAL IMMATURE NEUTOROPHIL: 0.08 K/CU MM
TOTAL NUCLEATED RBC: 0 K/CU MM
TOTAL PROTEIN: 7.2 GM/DL (ref 6.4–8.2)
TROPONIN T: 0.06 NG/ML
TROPONIN T: 0.06 NG/ML
WBC # BLD: 12 K/CU MM (ref 4–10.5)

## 2022-10-31 PROCEDURE — 80048 BASIC METABOLIC PNL TOTAL CA: CPT

## 2022-10-31 PROCEDURE — 94640 AIRWAY INHALATION TREATMENT: CPT

## 2022-10-31 PROCEDURE — 6370000000 HC RX 637 (ALT 250 FOR IP): Performed by: PHYSICIAN ASSISTANT

## 2022-10-31 PROCEDURE — 84145 PROCALCITONIN (PCT): CPT

## 2022-10-31 PROCEDURE — 93010 ELECTROCARDIOGRAM REPORT: CPT | Performed by: INTERNAL MEDICINE

## 2022-10-31 PROCEDURE — 2580000003 HC RX 258: Performed by: PHYSICIAN ASSISTANT

## 2022-10-31 PROCEDURE — 99285 EMERGENCY DEPT VISIT HI MDM: CPT

## 2022-10-31 PROCEDURE — 80053 COMPREHEN METABOLIC PANEL: CPT

## 2022-10-31 PROCEDURE — 87635 SARS-COV-2 COVID-19 AMP PRB: CPT

## 2022-10-31 PROCEDURE — 84484 ASSAY OF TROPONIN QUANT: CPT

## 2022-10-31 PROCEDURE — 36415 COLL VENOUS BLD VENIPUNCTURE: CPT

## 2022-10-31 PROCEDURE — 87040 BLOOD CULTURE FOR BACTERIA: CPT

## 2022-10-31 PROCEDURE — 87899 AGENT NOS ASSAY W/OPTIC: CPT

## 2022-10-31 PROCEDURE — 71045 X-RAY EXAM CHEST 1 VIEW: CPT

## 2022-10-31 PROCEDURE — 87449 NOS EACH ORGANISM AG IA: CPT

## 2022-10-31 PROCEDURE — 83690 ASSAY OF LIPASE: CPT

## 2022-10-31 PROCEDURE — 85025 COMPLETE CBC W/AUTO DIFF WBC: CPT

## 2022-10-31 PROCEDURE — 87804 INFLUENZA ASSAY W/OPTIC: CPT

## 2022-10-31 PROCEDURE — 6360000002 HC RX W HCPCS: Performed by: PHYSICIAN ASSISTANT

## 2022-10-31 PROCEDURE — 96375 TX/PRO/DX INJ NEW DRUG ADDON: CPT

## 2022-10-31 PROCEDURE — 96365 THER/PROPH/DIAG IV INF INIT: CPT

## 2022-10-31 PROCEDURE — 1200000000 HC SEMI PRIVATE

## 2022-10-31 PROCEDURE — 83880 ASSAY OF NATRIURETIC PEPTIDE: CPT

## 2022-10-31 PROCEDURE — 0202U NFCT DS 22 TRGT SARS-COV-2: CPT

## 2022-10-31 PROCEDURE — 93005 ELECTROCARDIOGRAM TRACING: CPT | Performed by: PHYSICIAN ASSISTANT

## 2022-10-31 PROCEDURE — 2500000003 HC RX 250 WO HCPCS: Performed by: PHYSICIAN ASSISTANT

## 2022-10-31 PROCEDURE — 83735 ASSAY OF MAGNESIUM: CPT

## 2022-10-31 RX ORDER — MIRTAZAPINE 15 MG/1
30 TABLET, FILM COATED ORAL NIGHTLY
Status: DISCONTINUED | OUTPATIENT
Start: 2022-10-31 | End: 2022-10-31

## 2022-10-31 RX ORDER — PROCHLORPERAZINE EDISYLATE 5 MG/ML
10 INJECTION INTRAMUSCULAR; INTRAVENOUS EVERY 6 HOURS PRN
Status: DISCONTINUED | OUTPATIENT
Start: 2022-10-31 | End: 2022-11-02 | Stop reason: HOSPADM

## 2022-10-31 RX ORDER — ALBUTEROL SULFATE 90 UG/1
2 AEROSOL, METERED RESPIRATORY (INHALATION)
Status: DISCONTINUED | OUTPATIENT
Start: 2022-10-31 | End: 2022-11-02 | Stop reason: HOSPADM

## 2022-10-31 RX ORDER — SODIUM CHLORIDE 0.9 % (FLUSH) 0.9 %
5-40 SYRINGE (ML) INJECTION PRN
Status: DISCONTINUED | OUTPATIENT
Start: 2022-10-31 | End: 2022-11-01 | Stop reason: SDUPTHER

## 2022-10-31 RX ORDER — POTASSIUM CHLORIDE 20 MEQ/1
40 TABLET, EXTENDED RELEASE ORAL PRN
Status: DISCONTINUED | OUTPATIENT
Start: 2022-10-31 | End: 2022-11-02 | Stop reason: HOSPADM

## 2022-10-31 RX ORDER — POLYETHYLENE GLYCOL 3350 17 G/17G
17 POWDER, FOR SOLUTION ORAL DAILY PRN
Status: DISCONTINUED | OUTPATIENT
Start: 2022-10-31 | End: 2022-11-02 | Stop reason: HOSPADM

## 2022-10-31 RX ORDER — SODIUM CHLORIDE 9 MG/ML
25 INJECTION, SOLUTION INTRAVENOUS PRN
Status: DISCONTINUED | OUTPATIENT
Start: 2022-10-31 | End: 2022-11-01 | Stop reason: SDUPTHER

## 2022-10-31 RX ORDER — ONDANSETRON 2 MG/ML
4 INJECTION INTRAMUSCULAR; INTRAVENOUS ONCE
Status: COMPLETED | OUTPATIENT
Start: 2022-10-31 | End: 2022-10-31

## 2022-10-31 RX ORDER — IMATINIB MESYLATE 400 MG/1
400 TABLET, FILM COATED ORAL DAILY
Status: DISCONTINUED | OUTPATIENT
Start: 2022-10-31 | End: 2022-11-02

## 2022-10-31 RX ORDER — ACETAMINOPHEN 650 MG/1
650 SUPPOSITORY RECTAL EVERY 6 HOURS PRN
Status: DISCONTINUED | OUTPATIENT
Start: 2022-10-31 | End: 2022-11-02 | Stop reason: HOSPADM

## 2022-10-31 RX ORDER — AZITHROMYCIN 250 MG/1
500 TABLET, FILM COATED ORAL EVERY 24 HOURS
Status: DISCONTINUED | OUTPATIENT
Start: 2022-11-01 | End: 2022-11-02 | Stop reason: HOSPADM

## 2022-10-31 RX ORDER — SODIUM CHLORIDE 0.9 % (FLUSH) 0.9 %
5-40 SYRINGE (ML) INJECTION EVERY 12 HOURS SCHEDULED
Status: DISCONTINUED | OUTPATIENT
Start: 2022-10-31 | End: 2022-11-01 | Stop reason: SDUPTHER

## 2022-10-31 RX ORDER — ASPIRIN 81 MG/1
324 TABLET, CHEWABLE ORAL ONCE
Status: COMPLETED | OUTPATIENT
Start: 2022-10-31 | End: 2022-10-31

## 2022-10-31 RX ORDER — ASPIRIN 81 MG/1
81 TABLET ORAL DAILY
Status: DISCONTINUED | OUTPATIENT
Start: 2022-10-31 | End: 2022-10-31

## 2022-10-31 RX ORDER — NICOTINE 21 MG/24HR
1 PATCH, TRANSDERMAL 24 HOURS TRANSDERMAL EVERY 24 HOURS
Status: DISCONTINUED | OUTPATIENT
Start: 2022-10-31 | End: 2022-10-31

## 2022-10-31 RX ORDER — IPRATROPIUM BROMIDE AND ALBUTEROL SULFATE 2.5; .5 MG/3ML; MG/3ML
1 SOLUTION RESPIRATORY (INHALATION) EVERY 4 HOURS PRN
Status: DISCONTINUED | OUTPATIENT
Start: 2022-10-31 | End: 2022-11-02 | Stop reason: HOSPADM

## 2022-10-31 RX ORDER — ONDANSETRON 4 MG/1
4 TABLET, ORALLY DISINTEGRATING ORAL EVERY 8 HOURS PRN
Status: DISCONTINUED | OUTPATIENT
Start: 2022-10-31 | End: 2022-10-31 | Stop reason: ALTCHOICE

## 2022-10-31 RX ORDER — OXYCODONE HYDROCHLORIDE AND ACETAMINOPHEN 5; 325 MG/1; MG/1
1 TABLET ORAL EVERY 4 HOURS PRN
Status: DISCONTINUED | OUTPATIENT
Start: 2022-10-31 | End: 2022-11-02 | Stop reason: HOSPADM

## 2022-10-31 RX ORDER — FUROSEMIDE 20 MG/1
20 TABLET ORAL DAILY
Status: DISCONTINUED | OUTPATIENT
Start: 2022-10-31 | End: 2022-11-01

## 2022-10-31 RX ORDER — POTASSIUM CHLORIDE 7.45 MG/ML
10 INJECTION INTRAVENOUS ONCE
Status: COMPLETED | OUTPATIENT
Start: 2022-10-31 | End: 2022-10-31

## 2022-10-31 RX ORDER — OXYCODONE AND ACETAMINOPHEN 10; 325 MG/1; MG/1
1 TABLET ORAL EVERY 4 HOURS PRN
Status: DISCONTINUED | OUTPATIENT
Start: 2022-10-31 | End: 2022-10-31 | Stop reason: SDUPTHER

## 2022-10-31 RX ORDER — ENOXAPARIN SODIUM 100 MG/ML
40 INJECTION SUBCUTANEOUS DAILY
Status: DISCONTINUED | OUTPATIENT
Start: 2022-10-31 | End: 2022-11-02 | Stop reason: HOSPADM

## 2022-10-31 RX ORDER — ACETAMINOPHEN 325 MG/1
650 TABLET ORAL EVERY 6 HOURS PRN
Status: DISCONTINUED | OUTPATIENT
Start: 2022-10-31 | End: 2022-11-02 | Stop reason: HOSPADM

## 2022-10-31 RX ORDER — MAGNESIUM SULFATE IN WATER 40 MG/ML
2000 INJECTION, SOLUTION INTRAVENOUS ONCE
Status: COMPLETED | OUTPATIENT
Start: 2022-10-31 | End: 2022-10-31

## 2022-10-31 RX ORDER — GABAPENTIN 300 MG/1
300 CAPSULE ORAL NIGHTLY
Status: DISCONTINUED | OUTPATIENT
Start: 2022-10-31 | End: 2022-11-02 | Stop reason: HOSPADM

## 2022-10-31 RX ORDER — METOPROLOL SUCCINATE 25 MG/1
25 TABLET, EXTENDED RELEASE ORAL DAILY
Status: DISCONTINUED | OUTPATIENT
Start: 2022-10-31 | End: 2022-11-02 | Stop reason: HOSPADM

## 2022-10-31 RX ORDER — PROMETHAZINE HYDROCHLORIDE 25 MG/ML
25 INJECTION, SOLUTION INTRAMUSCULAR; INTRAVENOUS ONCE
Status: COMPLETED | OUTPATIENT
Start: 2022-10-31 | End: 2022-10-31

## 2022-10-31 RX ORDER — ONDANSETRON 2 MG/ML
4 INJECTION INTRAMUSCULAR; INTRAVENOUS EVERY 6 HOURS PRN
Status: DISCONTINUED | OUTPATIENT
Start: 2022-10-31 | End: 2022-10-31 | Stop reason: ALTCHOICE

## 2022-10-31 RX ORDER — PAROXETINE HYDROCHLORIDE 20 MG/1
30 TABLET, FILM COATED ORAL DAILY
Status: DISCONTINUED | OUTPATIENT
Start: 2022-10-31 | End: 2022-11-02 | Stop reason: HOSPADM

## 2022-10-31 RX ORDER — 0.9 % SODIUM CHLORIDE 0.9 %
1000 INTRAVENOUS SOLUTION INTRAVENOUS ONCE
Status: COMPLETED | OUTPATIENT
Start: 2022-10-31 | End: 2022-10-31

## 2022-10-31 RX ORDER — ASPIRIN 81 MG/1
81 TABLET ORAL DAILY
Status: DISCONTINUED | OUTPATIENT
Start: 2022-11-01 | End: 2022-11-02 | Stop reason: HOSPADM

## 2022-10-31 RX ORDER — PROCHLORPERAZINE MALEATE 5 MG/1
10 TABLET ORAL EVERY 6 HOURS PRN
Status: DISCONTINUED | OUTPATIENT
Start: 2022-10-31 | End: 2022-11-02 | Stop reason: HOSPADM

## 2022-10-31 RX ORDER — POTASSIUM CHLORIDE 7.45 MG/ML
10 INJECTION INTRAVENOUS PRN
Status: DISCONTINUED | OUTPATIENT
Start: 2022-10-31 | End: 2022-11-02 | Stop reason: HOSPADM

## 2022-10-31 RX ORDER — ALBUTEROL SULFATE 2.5 MG/3ML
2.5 SOLUTION RESPIRATORY (INHALATION)
Status: DISCONTINUED | OUTPATIENT
Start: 2022-10-31 | End: 2022-11-01

## 2022-10-31 RX ORDER — OXYCODONE HYDROCHLORIDE 5 MG/1
5 TABLET ORAL EVERY 4 HOURS PRN
Status: DISCONTINUED | OUTPATIENT
Start: 2022-10-31 | End: 2022-11-02 | Stop reason: HOSPADM

## 2022-10-31 RX ORDER — SENNA PLUS 8.6 MG/1
1 TABLET ORAL DAILY PRN
Status: DISCONTINUED | OUTPATIENT
Start: 2022-10-31 | End: 2022-11-02 | Stop reason: HOSPADM

## 2022-10-31 RX ADMIN — POTASSIUM CHLORIDE 10 MEQ: 7.45 INJECTION INTRAVENOUS at 22:19

## 2022-10-31 RX ADMIN — ONDANSETRON 4 MG: 2 INJECTION INTRAMUSCULAR; INTRAVENOUS at 12:17

## 2022-10-31 RX ADMIN — MAGNESIUM SULFATE 2000 MG: 2 INJECTION INTRAVENOUS at 16:31

## 2022-10-31 RX ADMIN — BUSPIRONE HYDROCHLORIDE 20 MG: 5 TABLET ORAL at 18:51

## 2022-10-31 RX ADMIN — GABAPENTIN 300 MG: 300 CAPSULE ORAL at 20:23

## 2022-10-31 RX ADMIN — SODIUM CHLORIDE, PRESERVATIVE FREE 10 ML: 5 INJECTION INTRAVENOUS at 20:23

## 2022-10-31 RX ADMIN — ALBUTEROL SULFATE 2 PUFF: 90 AEROSOL, METERED RESPIRATORY (INHALATION) at 19:46

## 2022-10-31 RX ADMIN — POTASSIUM CHLORIDE 10 MEQ: 7.46 INJECTION, SOLUTION INTRAVENOUS at 13:03

## 2022-10-31 RX ADMIN — POTASSIUM BICARBONATE 40 MEQ: 782 TABLET, EFFERVESCENT ORAL at 13:04

## 2022-10-31 RX ADMIN — ASPIRIN 324 MG: 81 TABLET, CHEWABLE ORAL at 15:18

## 2022-10-31 RX ADMIN — SODIUM CHLORIDE 1000 ML: 9 INJECTION, SOLUTION INTRAVENOUS at 12:18

## 2022-10-31 RX ADMIN — PROMETHAZINE HYDROCHLORIDE 25 MG: 25 INJECTION INTRAMUSCULAR; INTRAVENOUS at 15:19

## 2022-10-31 RX ADMIN — METOPROLOL SUCCINATE 25 MG: 25 TABLET, FILM COATED, EXTENDED RELEASE ORAL at 18:53

## 2022-10-31 RX ADMIN — CEFTRIAXONE 1000 MG: 1 INJECTION, POWDER, FOR SOLUTION INTRAMUSCULAR; INTRAVENOUS at 15:18

## 2022-10-31 RX ADMIN — PAROXETINE HYDROCHLORIDE 30 MG: 20 TABLET, FILM COATED ORAL at 18:50

## 2022-10-31 RX ADMIN — DOXYCYCLINE 100 MG: 100 INJECTION, POWDER, LYOPHILIZED, FOR SOLUTION INTRAVENOUS at 15:19

## 2022-10-31 ASSESSMENT — PAIN SCALES - GENERAL: PAINLEVEL_OUTOF10: 0

## 2022-10-31 NOTE — ED PROVIDER NOTES
12 lead EKG per my interpretation:  Normal Sinus Rhythm 91  Axis is   Normal  QTc is   418  There is no specific T wave changes appreciated. There is no specific ST wave changes appreciated.     Prior EKG to compare with was not available        Mendel Freed, DO  10/31/22 [id number]

## 2022-10-31 NOTE — ED NOTES
Instructed patient that urine sample is needed. Unable to urinate at this time. She will try after fluids are completed.       Christopher Cordero RN  10/31/22 1972 Yes

## 2022-10-31 NOTE — ED NOTES
[id number] Evie Rodriguez  10/31/22 1423 Orthopaedic Post-op Check    S:  Doing well post-operatively.  Reports pain is under good control.  No new numbness/tingling.  Reports discomfort in his left eye, like a foreign object is in it.  Associated blurry vision.    O:    General:  Pain is well controlled, no acute distress  Neuro: EOMI in all directions. Smile symmetric, no focal deficits.  Resp:  Breathing comfortably  MSK:  Dressings C/D/I, drain in place with moderate bloody output.  5/5 strength throughout BUE and BLE.  SILT throughout BUE and BLE.      A/P:    Edvin Norton is a 66 year old male who is POD#0 status-post C4-C7 ACDF and right ICBG with Dr. Anderson.  Doing well in the immediate post-op period.    Continue cares as ordered.  MDA to assess the patient's left eye discomfort.  No signs of acute neurologic process.    Serge Sauceda MD  Orthopaedic Surgery PGY-4  #: [phone]

## 2022-10-31 NOTE — ED PROVIDER NOTES
EMERGENCY DEPARTMENT ENCOUNTER      PCP: [address]    Chief Complaint   Patient presents with    Emesis     Vomiting since last tuesday       This patient was not evaluated by the attending physician. I have independently evaluated this patient. HPI    Luz Maria Mcrae is a 63 y.o. female who presents with cough, vomiting and shortness of breath. Onset 6 days ago. Patient has had associated chills. Patient denies chest pain. Patient states she has been able to keep anything down for the past 6 days. Patient has history of leukemia and is on chemotherapy. Patient denies abdominal pain, diarrhea. Patient denies any urinary symptoms. Patient has associated lightheadedness. No known alleviating factors.       REVIEW OF SYSTEMS    Constitutional: See HPI  HENT:  Denies sore throat or ear pain   Cardiovascular:  Denies chest pain  Respiratory: See HPI  GI: See HPI  :  Denies any urinary symptoms   Musculoskeletal:  Denies lower extremity swelling  Skin:  Denies rash  Neurologic:  Denies focal weakness or sensory changes   Lymphatic:  Denies swollen glands     All other review of systems are negative  See HPI and nursing notes for additional information     PAST MEDICAL AND SURGICAL HISTORY    Past Medical History:   Diagnosis Date    Aortic valve disease     CML (chronic myelocytic leukemia) ([address]     H/O cardiac catheterization 08/04/2017    normal arteries, severe AS    H/O echocardiogram 05/2018    EF50-55% prostetic AV, mild Mr, trace-mild TR    Leukemia ([address]     Leukemia ([address]     Lung mass     Ovarian cancer ([address]      Past Surgical History:   Procedure Laterality Date    AORTIC VALVE REPLACEMENT  09/25/2017    TAVR-[facility]    BONE MARROW BIOPSY  feb 2012    CHOLECYSTECTOMY      DENTAL SURGERY      HYSTERECTOMY (CERVIX STATUS UNKNOWN)      THORACOTOMY         CURRENT MEDICATIONS    Current Outpatient Rx   Medication Sig Dispense Refill    BUSPIRONE HCL PO 20 mg furosemide (LASIX) 20 MG tablet 20 mg      PARoxetine (PAXIL) 30 MG tablet 30 mg      oxyCODONE-acetaminophen (PERCOCET)  MG per tablet Take 1 tablet by mouth every 4 hours as needed for Pain . aspirin 81 MG EC tablet Take 1 tablet by mouth daily 90 tablet 3    metoprolol succinate (TOPROL XL) 25 MG extended release tablet Take 0.5 tablets by mouth daily (Patient taking differently: Take 25 mg by mouth daily ) 90 tablet 3    GABAPENTIN PO Take 300 mg by mouth nightly       acetaminophen (TYLENOL) 325 MG tablet Take 650 mg by mouth every 6 hours as needed for Pain      mirtazapine (REMERON) 30 MG tablet Take 30 mg by mouth nightly      nicotine (NICODERM CQ) 21 MG/24HR Place 1 patch onto the skin every 24 hours      promethazine (PHENERGAN) 25 MG tablet Take 1 tablet by mouth every 6 hours as needed for Nausea for 15 doses. (Patient not taking: Reported on 4/19/2022) 15 tablet 0    imatinib (GLEEVEC) 400 MG tablet Take 400 mg by mouth daily. ALLERGIES    Allergies   Allergen Reactions    Ampicillin Other (See Comments)     Vein start to burn and swelling.  Entire arm     Bee Venom Anaphylaxis    Penicillins Swelling and Rash    Typhoid Vaccines Other (See Comments)     Pt cannot remember     Codeine Nausea And Vomiting    Morphine Nausea And Vomiting    Tape [Adhesive Tape] Rash    Vicodin [Hydrocodone-Acetaminophen] Nausea And Vomiting       SOCIAL AND FAMILY HISTORY    Social History     Socioeconomic History    Marital status:      Spouse name: None    Number of children: None    Years of education: None    Highest education level: None   Tobacco Use    Smoking status: Former     Packs/day: 0.50     Years: 38.00     Pack years: 19.00     Types: Cigarettes    Smokeless tobacco: Never   Vaping Use    Vaping Use: Every day    Substances: Nicotine    Passive vaping exposure: Yes   Substance and Sexual Activity    Alcohol use: No    Drug use: Yes     Types: Marijuana Mallory Leigh)    Sexual activity: Never     Family History   Problem Relation Age of Onset    Cancer Mother     Kidney Disease Mother     Heart Disease Maternal Grandmother          PHYSICAL EXAM    VITAL SIGNS: /87   Pulse (!) 115   Temp 97.7 °F (36.5 °C) (Oral)   Resp 15   Ht 5' 6\" (1.676 m)   Wt 197 lb (89.4 kg)   SpO2 92%   BMI 31.80 kg/m²    Constitutional: Elderly appearing female, appears nauseated  HENT:  Normocephalic, Atraumatic, PERRL. Moist mucous membranes  Neck/Lymphatics: supple, no JVD, no swollen nodes  Cardiovascular:  Normal heart rate, Normal rhythm  Respiratory:  Nonlabored breathing. Normal breath sounds, No wheezing  Abdomen: Bowel sounds normal, Soft, No tenderness  Musculoskeletal: No edema, No tenderness, No cyanosis  Integument:  Warm, Dry  Neurologic:  Alert & oriented , No focal deficits noted. Sensation intact.   Psychiatric:  Affect normal, Mood normal.       Labs:  Results for orders placed or performed during the hospital encounter of 10/31/22   COVID-19, Rapid    Specimen: Nasopharyngeal   Result Value Ref Range    SARS-CoV-2, NAAT NOT DETECTED NOT DETECTED   Rapid influenza A/B antigens    Specimen: Nasopharyngeal   Result Value Ref Range    Rapid Influenza A Ag NEGATIVE NEGATIVE    Rapid Influenza B Ag NEGATIVE NEGATIVE   CBC with Auto Differential   Result Value Ref Range    WBC 12.0 (H) 4.0 - 10.5 K/CU MM    RBC 4.40 4.2 - 5.4 M/CU MM    Hemoglobin 13.8 12.5 - 16.0 GM/DL    Hematocrit 38.4 37 - 47 %    MCV 87.3 78 - 100 FL    MCH 31.4 (H) 27 - 31 PG    MCHC 35.9 32.0 - 36.0 %    RDW 13.5 11.7 - 14.9 %    Platelets 200 957 - 837 K/CU MM    MPV 10.8 7.5 - 11.1 FL    Differential Type AUTOMATED DIFFERENTIAL     Segs Relative 79.1 (H) 36 - 66 %    Lymphocytes % 14.2 (L) 24 - 44 %    Monocytes % 5.6 (H) 0 - 4 %    Eosinophils % 0.2 0 - 3 %    Basophils % 0.2 0 - 1 %    Segs Absolute 9.5 K/CU MM    Lymphocytes Absolute 1.7 K/CU MM    Monocytes Absolute 0.7 K/CU MM    Eosinophils Absolute 0.0 K/CU MM Basophils Absolute 0.0 K/CU MM    Nucleated RBC % 0.0 %    Total Nucleated RBC 0.0 K/CU MM    Total Immature Neutrophil 0.08 K/CU MM    Immature Neutrophil % 0.7 (H) 0 - 0.43 %   Comprehensive Metabolic Panel   Result Value Ref Range    Sodium 134 (L) 135 - 145 MMOL/L    Potassium 2.7 (LL) 3.5 - 5.1 MMOL/L    Chloride 95 (L) 99 - 110 mMol/L    CO2 26 21 - 32 MMOL/L    BUN 31 (H) 6 - 23 MG/DL    Creatinine 1.4 (H) 0.6 - 1.1 MG/DL    Est, Glom Filt Rate 43 (L) >60 mL/min/1.73m2    Glucose 128 (H) 70 - 99 MG/DL    Calcium 8.9 8.3 - 10.6 MG/DL    Albumin 4.3 3.4 - 5.0 GM/DL    Total Protein 7.2 6.4 - 8.2 GM/DL    Total Bilirubin 0.7 0.0 - 1.0 MG/DL    ALT 17 10 - 40 U/L    AST 13 (L) 15 - 37 IU/L    Alkaline Phosphatase 92 40 - 129 IU/L    Anion Gap 13 4 - 16   Lipase   Result Value Ref Range    Lipase 23 13 - 60 IU/L   Troponin   Result Value Ref Range    Troponin T 0.064 (H) <0.01 NG/ML   Brain Natriuretic Peptide   Result Value Ref Range    Pro-BNP 1,199 (H) <300 PG/ML   Magnesium   Result Value Ref Range    Magnesium 2.1 1.8 - 2.4 mg/dl   EKG 12 Lead   Result Value Ref Range    Ventricular Rate 101 BPM    Atrial Rate 163 BPM    QRS Duration 86 ms    Q-T Interval 480 ms    QTc Calculation (Bazett) 622 ms    P Axis 75 degrees    R Axis 29 degrees    T Axis 166 degrees    Diagnosis       Undetermined rhythm  ST & T wave abnormality, consider inferior ischemia  Abnormal ECG  When compared with ECG of 27-SEP-2017 21:28,  Current undetermined rhythm precludes rhythm comparison, needs review  T wave inversion now evident in Inferior leads  Nonspecific T wave abnormality no longer evident in Anterior leads  Nonspecific T wave abnormality now evident in Lateral leads  QT has lengthened     EKG 12 Lead   Result Value Ref Range    Ventricular Rate 94 BPM    Atrial Rate 94 BPM    P-R Interval 130 ms    QRS Duration 84 ms    Q-T Interval 518 ms    QTc Calculation (Bazett) 647 ms    P Axis 57 degrees    R Axis -6 degrees    T Axis 63 degrees    Diagnosis       Sinus rhythm with frequent premature ventricular complexes  Nonspecific ST abnormality  Abnormal ECG  When compared with ECG of 31-OCT-2022 12:31,  Previous ECG has undetermined rhythm, needs review  T wave inversion no longer evident in Inferior leads             EKG      EKG Interpretation  Please see ED physician's note for EKG interpretation      RADIOLOGY    XR CHEST PORTABLE   Final Result   Bibasilar infiltrate      Status post TAVR                 ED COURSE & MEDICAL DECISION MAKING      Patient presents as above. Provide IV fluids and Zofran. See physician note for EKG reading. Rapid COVID and influenza are negative. Chest x-ray shows bibasilar infiltrates. CBC shows white blood cell of 12. CMP shows potassium 134, sodium 134, potassium 2.7, chloride 95, BUN 31, creatinine 1.4. Lipase 23. Troponin is 0.064. BNP is 1199. Magnesium 2.1. IV and oral replacement of potassium ordered. IV magnesium ordered due to hypokalemia and prolonged QTC. IM Phenergan ordered for further nausea and vomiting. Patient has no abdominal tenderness and I currently have low suspicion of intra-abdominal process and suspect her symptoms are due to pneumonia. Patient denies any recent admissions, IV Rocephin and doxycycline are ordered. Patient was provided aspirin due to elevated troponin. I believe patient requires admission for further evaluation and treatment. Consult hospitalist who accepts admission. Sepsis fluids not given due to elevated BNP and wanting to avoid fluid overload. Clinical  IMPRESSION    1. Pneumonia of both lower lobes due to infectious organism    2. Nausea and vomiting, unspecified vomiting type    3. Hypokalemia    4.  Elevated troponin        Patient admitted      Comment: Please note this report has been produced using speech recognition software and may contain errors related to that system including errors in grammar, punctuation, and spelling, as well as words and phrases that may be inappropriate. If there are any questions or concerns please feel free to contact the dictating provider for clarification.             Denise Kaminski PA-C  10/31/22 1270

## 2022-10-31 NOTE — ED NOTES
ED TO INPATIENT SBAR HANDOFF    Patient Name: Joanne Jarquin   :  1960  63 y.o. MRN:  [id number]  Preferred Name  Shirley Miller  ED Room #:  ED26/ED-26  Family/Caregiver Present yes   Restraints no   Sitter no   Sepsis Risk Score Sepsis Risk Score: 0.74    Situation  Code Status: Prior No additional code details. Allergies: Ampicillin, Bee venom, Penicillins, Typhoid vaccines, Codeine, Morphine, Tape [adhesive tape], and Vicodin [hydrocodone-acetaminophen]  Weight: Patient Vitals for the past 96 hrs (Last 3 readings):   Weight   10/31/22 1107 197 lb (89.4 kg)     Arrived from: home  Chief Complaint:   Chief Complaint   Patient presents with    Emesis     Vomiting since last [address] Problem/Diagnosis:  Principal Problem:    Community acquired pneumonia, bilateral  Resolved Problems:    * No resolved hospital problems.  *    Imaging:   XR CHEST PORTABLE   Final Result   Bibasilar infiltrate      Status post TAVR           Abnormal labs:   Abnormal Labs Reviewed   CBC WITH AUTO DIFFERENTIAL - Abnormal; Notable for the following components:       Result Value    WBC 12.0 (*)     MCH 31.4 (*)     Segs Relative 79.1 (*)     Lymphocytes % 14.2 (*)     Monocytes % 5.6 (*)     Immature Neutrophil % 0.7 (*)     All other components within normal limits   COMPREHENSIVE METABOLIC PANEL - Abnormal; Notable for the following components:    Sodium 134 (*)     Potassium 2.7 (*)     Chloride 95 (*)     BUN 31 (*)     Creatinine 1.4 (*)     Est, Glom Filt Rate 43 (*)     Glucose 128 (*)     AST 13 (*)     All other components within normal limits   TROPONIN - Abnormal; Notable for the following components:    Troponin T 0.064 (*)     All other components within normal limits   BRAIN NATRIURETIC PEPTIDE - Abnormal; Notable for the following components:    Pro-BNP 1,199 (*)     All other components within normal limits     Critical values: yes; K = 2.7    Abnormal Assessment Findings: NSR to ST (120-170s) on monitor with frequent PVCs. Lung sounds diminished. Persistent nausea and dry heaves. Skin dry and flaky. Background  History:   Past Medical History:   Diagnosis Date    Aortic valve disease     CML (chronic myelocytic [facility]) [facility]     H/O cardiac catheterization 08/04/2017    normal arteries, severe AS    H/O echocardiogram 05/2018    EF50-55% prostetic AV, mild Mr, trace-mild TR    Leukemia ([facility]     Leukemia ([facility]     Lung mass     Ovarian cancer ([facility]        Assessment    Vitals/MEWS:        Vitals:    10/31/22 1107   BP: 129/62   Pulse: 78   Resp: 18   Temp: 97.7 °F (36.5 °C)   TempSrc: Oral   SpO2: 94%   Weight: 197 lb (89.4 kg)   Height: 5' 6\" (1.676 m)     FiO2 (%): N/A  O2 Flow Rate: O2 Device: None (Room air)    Cardiac Rhythm:  NSR/ST  Pain Assessment:  [x] Verbal [] [place] Scale  Pain Scale:  Denies pain  Last documented pain score (0-10 scale)  0  Last documented pain medication administered: n/a  Mental Status: oriented, alert, coherent, logical, thought processes intact, and able to concentrate and follow conversation  NIH Score:    C-SSRS: Risk of Suicide: No Risk  Bedside swallow:    [place] Coma Scale (GCS): Active LDA's:   Peripheral IV 10/31/22 Right Antecubital (Active)   Site Assessment Clean, dry & intact 10/31/22 1151   Line Status Blood return noted; Flushed;Normal saline locked;Specimen collected 10/31/22 [facility] checked and tightened 10/31/22 1151   Phlebitis Assessment No symptoms 10/31/22 1151   Infiltration Assessment 0 10/31/22 1151   Alcohol Cap Used No 10/31/22 1151   Dressing Status New dressing applied;Clean, dry & intact 10/31/22 1151   Dressing Type Transparent 10/31/22 1151   Dressing Intervention New 10/31/22 1151     PO Status: Regular  Pertinent or High Risk Medications/Drips: no   If Yes, please provide details: N/A  Pending Blood Product Administration: no     You may also review the ED PT Care Timeline found under the Summary Nursing Index tab.    Recommendation    Pending orders; No  Plan for Discharge (if known):    Additional Comments: no   If any further questions, please call Sending RN at [phone]    Electronically signed by: Electronically signed by Aleja Roblero RN on 10/31/2022 at 3:02 PM       Aleja Roblero RN  10/31/22 Ana Tiwari [phone], CARLOS  10/31/22 Ana Tiwari [address], [facility]  10/31/22 6325

## 2022-10-31 NOTE — H&P
History and Physical      Name:  Susi Morris /Age/Sex: 1960  (63 y.o. female)   MRN & CSN:  [id number] & [id number] Encounter Date/Time: 10/31/2022 2:32 PM EDT   Location:  ED26/ED-26 PCP: Rocky Dotson Day: 1    Assessment and Plan:     Susi Morris is a 63 y.o. female who presents with a chief complaint of cough and nausea. Medical decision making:  Bilateral community acquired pneumonia  SIRS 2/2 above (leukocytosis and tachycardia)  CXR with infiltrates. Not requiring oxygen. Flu negative. Tachycardia may also be 2/2 not tolerating metoprolol for the past week due to nausea  Ceftriaxone and Azithromycin started, continue  Blood cultures, sputum cuture, legionella antigen, strep pneumonia antigen, respiratory disease panel pending  PRN O2 via NC  PRN duonebs and antitussive  Hypokalemia likely 2/2 intractable N/V  Acute kidney injury, in the setting of N/V/decreased oral intake  Patient clinically appears euvolemic. Given 1 L NaCl in ER  Hold home lasix  Detectable troponin, prolonged QTc  No chest pain.  EKG with Qtc of 647  Trend troponin  Tele monitoring  Hx of TAVR in 2017 at [facility] ASA, lasix, metoprolol  History of radial artery thrombus  Chronic myeloid leukemia  Continue Gleevec  History of ovarian cancer   Essential hypertension  Paroxysmal atrial fibrillation  Continue home metoprolol  Thoracic back pain  Continue home gabapentin and oxycodone  History of pneumothorax      Disposition:   Current Living situation: Home with daughter  Expected Disposition: Same  Estimated D/C: Several days    Diet Regular   DVT Prophylaxis [x] Lovenox, []  Heparin, [] SCDs, [] Ambulation,  [] Eliquis, [] Xarelto   Code Status Full   Surrogate Decision Maker/ POA Family     History from:   Patient and EMR and daughter at bedside  History of Present Illness:   Chief Complaint: Nausea and cough  Susi Morris is a 63 y.o. female with pmh of CML, thrombosis, pneumothorax, TAVR who presents to the ER for evaluation of cough and nausea for the past week. She has had no known sick contacts. She states she initially was vomiting but has not been for the past 6 days. She describes an ongoing cough and subjective fevers and chills although she has not taken her temperature. She has not been able to hold down any of her oral pills for the past week. She denies abdominal pain, flank pain, neck pain, headaches, dysuria or hematuria. Patient was found to have bilateral pneumonia on chest x-ray. Influenza was negative. She also was noted to have a prolonged QTC and was given magnesium. Troponin was mildly detectable. She was started on antibiotics and admitted for further treatment. Patient's past medical, social, and family history have been reviewed. Patient case discussed with ED provider Karma Gray, PAMELA  Review of Systems:    10 point system reviewed, negative, unless as noted in above HPI. Objective:   No intake or output data in the 24 hours ending 10/31/22 1432   Vitals:   Vitals:    10/31/22 1107   BP: 129/62   Pulse: 78   Resp: 18   Temp: 97.7 °F (36.5 °C)   TempSrc: Oral   SpO2: 94%   Weight: 197 lb (89.4 kg)   Height: 5' 6\" (1.676 m)     Medications Prior to Admission     Prior to Admission medications    Medication Sig Start Date End Date Taking? Authorizing Provider   BUSPIRONE HCL PO 20 mg 2/2/22   Historical Provider, MD   furosemide (LASIX) 20 MG tablet 20 mg 12/16/21   Historical Provider, MD   PARoxetine (PAXIL) 30 MG tablet 30 mg 2/2/22   Historical Provider, MD   clopidogrel (PLAVIX) 75 MG tablet Take 1 tablet by mouth daily  Patient not taking: Reported on 4/19/2022 10/16/17   Kendy Luois MD   oxyCODONE-acetaminophen (PERCOCET)  MG per tablet Take 1 tablet by mouth every 4 hours as needed for Pain .     Historical Provider, MD   aspirin 81 MG EC tablet Take 1 tablet by mouth daily 8/5/17   Pratik Funez MD   metoprolol succinate (TOPROL XL) 25 MG extended release tablet Take 0.5 tablets by mouth daily  Patient taking differently: Take 25 mg by mouth daily  8/5/17   Alhaji Miguel MD   GABAPENTIN PO Take 300 mg by mouth nightly     Historical Provider, MD   acetaminophen (TYLENOL) 325 MG tablet Take 650 mg by mouth every 6 hours as needed for Pain    Historical Provider, MD   mirtazapine (REMERON) 30 MG tablet Take 30 mg by mouth nightly    Historical Provider, MD   nicotine (NICODERM CQ) 21 MG/24HR Place 1 patch onto the skin every 24 hours    Historical Provider, MD   promethazine (PHENERGAN) 25 MG tablet Take 1 tablet by mouth every 6 hours as needed for Nausea for 15 doses. Patient not taking: Reported on 4/19/2022 1/17/14   Sheryl Heredia MD   imatinib (GLEEVEC) 400 MG tablet Take 400 mg by mouth daily. Historical Provider, MD     Physical Exam:      GEN -Awake. NAD. Appears given age. EYES -PERRLA. No scleral erythema, discharge, or conjunctivitis. HENT -MM are moist. Oral pharynx without exudates, no evidence of thrush. NECK -Supple, no apparent thyromegaly or masses. RESP -CTA, no wheezes, rales or rhonchi. Symmetric chest movement while on room air. C/V -S1/S2 auscultated. Tachycardic without appreciable M/R/G. No JVD or carotid bruits. Peripheral pulses equal bilaterally and palpable. Cap refill <3 sec. No peripheral edema. GI -Abdomen is soft non distended and without significant tenderness to palpation. + BS. No masses or guarding.  -No CVA/ flank tenderness. Joseph catheter is not present. LYMPH-No palpable cervical lymphadenopathy and no hepatosplenomegaly. No petechiae or ecchymoses. MS -No gross joint deformities. SKIN -Normal coloration, warm, dry. [place], alert, oriented x  person, place, time, situation. Cranial nerves appear grossly intact, normal speech, no lateralizing weakness. PSYC- Appropriate affect.     Past Medical History:   PMHx   Past Medical History:   Diagnosis Date    Aortic valve disease     [facility]     H/O cardiac catheterization 08/04/2017    normal arteries, severe AS    H/O echocardiogram 05/2018    EF50-55% prostetic AV, mild Mr, trace-mild TR    Leukemia ([facility]     Leukemia ([facility]     Lung mass     Ovarian cancer ([facility]      PSHX:  has a past surgical history that includes Hysterectomy; bone marrow biopsy (feb 2012); Cholecystectomy; Dental surgery; thoracotomy; and Aortic valve replacement (09/25/2017). Allergies: Allergies   Allergen Reactions    Ampicillin Other (See Comments)     Vein start to burn and swelling. Entire arm     Bee Venom Anaphylaxis    Penicillins Swelling and Rash    Typhoid Vaccines Other (See Comments)     Pt cannot remember     Codeine Nausea And Vomiting    Morphine Nausea And Vomiting    Tape [place] Tape] Rash    Vicodin [Hydrocodone-Acetaminophen] Nausea And Vomiting     Fam HX: family history includes Cancer in her mother; Heart Disease in her maternal grandmother; Kidney Disease in her mother.   Soc HX:   Social History     Socioeconomic History    Marital status:      Spouse name: None    Number of children: None    Years of education: None    Highest education level: None   Tobacco Use    Smoking status: Former     Packs/day: 0.50     Years: 38.00     Pack years: 19.00     Types: Cigarettes    Smokeless tobacco: Never   Vaping Use    Vaping Use: Every day    Substances: Nicotine    Passive vaping exposure: Yes   Substance and Sexual Activity    Alcohol use: No    Drug use: Yes     Types: Marijuana Emerson Radon)    Sexual activity: Never     Medications:   Medications:    aspirin  324 mg Oral Once    promethazine  25 mg IntraMUSCular Once    cefTRIAXone (ROCEPHIN) IV  1,000 mg IntraVENous Once    doxycycline (VIBRAMYCIN) IV  100 mg IntraVENous Once    magnesium sulfate  2,000 mg IntraVENous Once      Infusions:   PRN Meds:    Labs    XR CHEST PORTABLE    Result Date: 10/31/2022  EXAMINATION: ONE XRAY VIEW OF THE CHEST 10/31/2022 12:15 pm COMPARISON: 10/27/2017 HISTORY: ORDERING SYSTEM PROVIDED HISTORY: SOB cough TECHNOLOGIST PROVIDED HISTORY: Reason for exam:->SOB cough Reason for Exam: sob, cough FINDINGS: There are surgical clips in the right hilar region. The patient is status post TAVR. The heart size and pulmonary vascularity are normal there is no evidence for pulmonary edema. There is bibasilar infiltrate. Surgical staple line in the right mid lung zone. Bibasilar infiltrate Status post TAVR       CBC:   Recent Labs     10/31/22  1145   WBC 12.0*   HGB 13.8        BMP:    Recent Labs     10/31/22  1145   *   K 2.7*   CL 95*   CO2 26   BUN 31*   CREATININE 1.4*   GLUCOSE 128*     Hepatic:   Recent Labs     10/31/22  1145   AST 13*   ALT 17   BILITOT 0.7   ALKPHOS 92     Lipids: No results found for: CHOL, HDL, TRIG  Hemoglobin A1C: No results found for: LABA1C  TSH: No results found for: TSH  Troponin:   Lab Results   Component Value Date/Time    TROPONINT 0.064 10/31/2022 11:45 AM    TROPONINT <0.010 08/06/2017 10:22 PM    TROPONINT <0.010 08/04/2017 09:43 AM     Lactic Acid: No results for input(s): LACTA in the last 72 hours.   BNP:   Recent Labs     10/31/22  1145   PROBNP 1,199*     UA:  Lab Results   Component Value Date/Time    NITRU NEGATIVE 01/17/2014 04:50 PM    COLORU YELLOW 01/17/2014 04:50 PM    WBCUA <1 01/17/2014 04:50 PM    RBCUA <1 01/17/2014 04:50 PM    MUCUS RARE 01/17/2014 04:50 PM    BACTERIA NEGATIVE 01/17/2014 04:50 PM    CLARITYU CLEAR 01/17/2014 04:50 PM    SPECGRAV 1.017 01/17/2014 04:50 PM    LEUKOCYTESUR NEGATIVE 01/17/2014 04:50 PM    UROBILINOGEN 2.0 01/17/2014 04:50 PM    BILIRUBINUR NEGATIVE 01/17/2014 04:50 PM    BLOODU NEGATIVE 01/17/2014 04:50 PM    KETUA NEGATIVE 01/17/2014 04:50 PM     Urine Cultures: No results found for: LABURIN  Blood Cultures: No results found for: BC  No results found for: BLOODCULT2  Organism: No results found for: ORG  Imaging/Diagnostics Last 24 Hours   XR CHEST PORTABLE    Result Date: 10/31/2022  EXAMINATION: ONE XRAY VIEW OF THE CHEST 10/31/2022 12:15 pm COMPARISON: 10/27/2017 HISTORY: ORDERING SYSTEM PROVIDED HISTORY: SOB cough TECHNOLOGIST PROVIDED HISTORY: Reason for exam:->SOB cough Reason for Exam: sob, cough FINDINGS: There are surgical clips in the right hilar region. The patient is status post TAVR. The heart size and pulmonary vascularity are normal there is no evidence for pulmonary edema. There is bibasilar infiltrate. Surgical staple line in the right mid lung zone.      Bibasilar infiltrate Status post TAVR       Personally reviewed Lab Studies, Imaging, and discussed case with Dr. Jak Franco PA-C  Hospitalist  10/31/2022, 2:32 PM

## 2022-10-31 NOTE — CARE COORDINATION
MCG criteria for PNE reviewed at this time, criteria supports inpatient admission on initial review.  FREDERICK,RN/CM

## 2022-11-01 LAB
ALBUMIN SERPL-MCNC: 3.6 GM/DL (ref 3.4–5)
ANION GAP SERPL CALCULATED.3IONS-SCNC: 10 MMOL/L (ref 4–16)
ANION GAP SERPL CALCULATED.3IONS-SCNC: 8 MMOL/L (ref 4–16)
ANION GAP SERPL CALCULATED.3IONS-SCNC: 8 MMOL/L (ref 4–16)
BASOPHILS ABSOLUTE: 0 K/CU MM
BASOPHILS RELATIVE PERCENT: 0.3 % (ref 0–1)
BILIRUBIN URINE: NEGATIVE MG/DL
BLOOD, URINE: NEGATIVE
BUN BLDV-MCNC: 18 MG/DL (ref 6–23)
BUN BLDV-MCNC: 18 MG/DL (ref 6–23)
BUN BLDV-MCNC: 22 MG/DL (ref 6–23)
CALCIUM SERPL-MCNC: 8 MG/DL (ref 8.3–10.6)
CALCIUM SERPL-MCNC: 8.4 MG/DL (ref 8.3–10.6)
CALCIUM SERPL-MCNC: 8.4 MG/DL (ref 8.3–10.6)
CHLORIDE BLD-SCNC: 100 MMOL/L (ref 99–110)
CHLORIDE BLD-SCNC: 100 MMOL/L (ref 99–110)
CHLORIDE BLD-SCNC: 99 MMOL/L (ref 99–110)
CHLORIDE URINE RANDOM: 10 MMOL/L (ref 43–210)
CLARITY: CLEAR
CO2: 26 MMOL/L (ref 21–32)
CO2: 30 MMOL/L (ref 21–32)
CO2: 30 MMOL/L (ref 21–32)
COLOR: YELLOW
CREAT SERPL-MCNC: 1.2 MG/DL (ref 0.6–1.1)
CREAT SERPL-MCNC: 1.3 MG/DL (ref 0.6–1.1)
CREAT SERPL-MCNC: 1.3 MG/DL (ref 0.6–1.1)
CREATININE URINE: 43.3 MG/DL (ref 28–217)
DIFFERENTIAL TYPE: ABNORMAL
EOSINOPHILS ABSOLUTE: 0.1 K/CU MM
EOSINOPHILS RELATIVE PERCENT: 1.4 % (ref 0–3)
GFR SERPL CREATININE-BSD FRML MDRD: 47 ML/MIN/1.73M2
GFR SERPL CREATININE-BSD FRML MDRD: 47 ML/MIN/1.73M2
GFR SERPL CREATININE-BSD FRML MDRD: 52 ML/MIN/1.73M2
GLUCOSE BLD-MCNC: 111 MG/DL (ref 70–99)
GLUCOSE BLD-MCNC: 112 MG/DL (ref 70–99)
GLUCOSE BLD-MCNC: 115 MG/DL (ref 70–99)
GLUCOSE, URINE: NEGATIVE MG/DL
HCT VFR BLD CALC: 29.8 % (ref 37–47)
HEMOGLOBIN: 10.9 GM/DL (ref 12.5–16)
IMMATURE NEUTROPHIL %: 0.6 % (ref 0–0.43)
KETONES, URINE: NEGATIVE MG/DL
LEGIONELLA URINARY AG: NEGATIVE
LEUKOCYTE ESTERASE, URINE: NEGATIVE
LYMPHOCYTES ABSOLUTE: 1.6 K/CU MM
LYMPHOCYTES RELATIVE PERCENT: 17.1 % (ref 24–44)
MAGNESIUM: 2.5 MG/DL (ref 1.8–2.4)
MCH RBC QN AUTO: 31.4 PG (ref 27–31)
MCHC RBC AUTO-ENTMCNC: 36.6 % (ref 32–36)
MCV RBC AUTO: 85.9 FL (ref 78–100)
MONOCYTES ABSOLUTE: 0.8 K/CU MM
MONOCYTES RELATIVE PERCENT: 8.7 % (ref 0–4)
NITRITE URINE, QUANTITATIVE: NEGATIVE
NUCLEATED RBC %: 0 %
PDW BLD-RTO: 13.6 % (ref 11.7–14.9)
PH, URINE: 6.5 (ref 5–8)
PHOSPHORUS: 1.9 MG/DL (ref 2.5–4.9)
PLATELET # BLD: 171 K/CU MM (ref 140–440)
PMV BLD AUTO: 10.8 FL (ref 7.5–11.1)
POTASSIUM SERPL-SCNC: 2.7 MMOL/L (ref 3.5–5.1)
POTASSIUM SERPL-SCNC: 3.2 MMOL/L (ref 3.5–5.1)
POTASSIUM SERPL-SCNC: 3.3 MMOL/L (ref 3.5–5.1)
POTASSIUM, UR: 2.6 MMOL/L (ref 22–119)
PROT/CREAT RATIO, UR: 0.2
PROTEIN UA: NEGATIVE MG/DL
RBC # BLD: 3.47 M/CU MM (ref 4.2–5.4)
SEGMENTED NEUTROPHILS ABSOLUTE COUNT: 6.7 K/CU MM
SEGMENTED NEUTROPHILS RELATIVE PERCENT: 71.9 % (ref 36–66)
SODIUM BLD-SCNC: 135 MMOL/L (ref 135–145)
SODIUM BLD-SCNC: 138 MMOL/L (ref 135–145)
SODIUM BLD-SCNC: 138 MMOL/L (ref 135–145)
SODIUM URINE: 10 MMOL/L (ref 35–167)
SPECIFIC GRAVITY UA: <1.005 (ref 1–1.03)
STREP PNEUMONIAE ANTIGEN: NORMAL
TOTAL IMMATURE NEUTOROPHIL: 0.06 K/CU MM
TOTAL NUCLEATED RBC: 0 K/CU MM
TROPONIN T: 0.03 NG/ML
TROPONIN T: 0.04 NG/ML
TROPONIN T: 0.05 NG/ML
URINE TOTAL PROTEIN: 7 MG/DL
UROBILINOGEN, URINE: 0.2 MG/DL (ref 0.2–1)
WBC # BLD: 9.3 K/CU MM (ref 4–10.5)

## 2022-11-01 PROCEDURE — 6370000000 HC RX 637 (ALT 250 FOR IP): Performed by: INTERNAL MEDICINE

## 2022-11-01 PROCEDURE — 6360000002 HC RX W HCPCS: Performed by: PHYSICIAN ASSISTANT

## 2022-11-01 PROCEDURE — 80048 BASIC METABOLIC PNL TOTAL CA: CPT

## 2022-11-01 PROCEDURE — 84484 ASSAY OF TROPONIN QUANT: CPT

## 2022-11-01 PROCEDURE — 84300 ASSAY OF URINE SODIUM: CPT

## 2022-11-01 PROCEDURE — 6360000002 HC RX W HCPCS: Performed by: STUDENT IN AN ORGANIZED HEALTH CARE EDUCATION/TRAINING PROGRAM

## 2022-11-01 PROCEDURE — 2500000003 HC RX 250 WO HCPCS: Performed by: INTERNAL MEDICINE

## 2022-11-01 PROCEDURE — 2500000003 HC RX 250 WO HCPCS: Performed by: STUDENT IN AN ORGANIZED HEALTH CARE EDUCATION/TRAINING PROGRAM

## 2022-11-01 PROCEDURE — 99254 IP/OBS CNSLTJ NEW/EST MOD 60: CPT | Performed by: INTERNAL MEDICINE

## 2022-11-01 PROCEDURE — 82570 ASSAY OF URINE CREATININE: CPT

## 2022-11-01 PROCEDURE — 2580000003 HC RX 258: Performed by: INTERNAL MEDICINE

## 2022-11-01 PROCEDURE — 85025 COMPLETE CBC W/AUTO DIFF WBC: CPT

## 2022-11-01 PROCEDURE — 36569 INSJ PICC 5 YR+ W/O IMAGING: CPT

## 2022-11-01 PROCEDURE — 94150 VITAL CAPACITY TEST: CPT

## 2022-11-01 PROCEDURE — 6370000000 HC RX 637 (ALT 250 FOR IP): Performed by: STUDENT IN AN ORGANIZED HEALTH CARE EDUCATION/TRAINING PROGRAM

## 2022-11-01 PROCEDURE — 2580000003 HC RX 258: Performed by: PHYSICIAN ASSISTANT

## 2022-11-01 PROCEDURE — 6370000000 HC RX 637 (ALT 250 FOR IP): Performed by: PHYSICIAN ASSISTANT

## 2022-11-01 PROCEDURE — 84133 ASSAY OF URINE POTASSIUM: CPT

## 2022-11-01 PROCEDURE — 80069 RENAL FUNCTION PANEL: CPT

## 2022-11-01 PROCEDURE — 83735 ASSAY OF MAGNESIUM: CPT

## 2022-11-01 PROCEDURE — 94761 N-INVAS EAR/PLS OXIMETRY MLT: CPT

## 2022-11-01 PROCEDURE — 02HV33Z INSERTION OF INFUSION DEVICE INTO SUPERIOR VENA CAVA, PERCUTANEOUS APPROACH: ICD-10-PCS | Performed by: STUDENT IN AN ORGANIZED HEALTH CARE EDUCATION/TRAINING PROGRAM

## 2022-11-01 PROCEDURE — 76937 US GUIDE VASCULAR ACCESS: CPT

## 2022-11-01 PROCEDURE — C1751 CATH, INF, PER/CENT/MIDLINE: HCPCS

## 2022-11-01 PROCEDURE — 1200000000 HC SEMI PRIVATE

## 2022-11-01 PROCEDURE — 36415 COLL VENOUS BLD VENIPUNCTURE: CPT

## 2022-11-01 PROCEDURE — 6360000002 HC RX W HCPCS: Performed by: INTERNAL MEDICINE

## 2022-11-01 PROCEDURE — 81003 URINALYSIS AUTO W/O SCOPE: CPT

## 2022-11-01 PROCEDURE — 94664 DEMO&/EVAL PT USE INHALER: CPT

## 2022-11-01 PROCEDURE — 82436 ASSAY OF URINE CHLORIDE: CPT

## 2022-11-01 PROCEDURE — 94640 AIRWAY INHALATION TREATMENT: CPT

## 2022-11-01 PROCEDURE — 2709999900 HC NON-CHARGEABLE SUPPLY

## 2022-11-01 PROCEDURE — 84156 ASSAY OF PROTEIN URINE: CPT

## 2022-11-01 RX ORDER — POTASSIUM CHLORIDE 7.45 MG/ML
10 INJECTION INTRAVENOUS
Status: DISPENSED | OUTPATIENT
Start: 2022-11-01 | End: 2022-11-01

## 2022-11-01 RX ORDER — SODIUM CHLORIDE 0.9 % (FLUSH) 0.9 %
5-40 SYRINGE (ML) INJECTION PRN
Status: DISCONTINUED | OUTPATIENT
Start: 2022-11-01 | End: 2022-11-01 | Stop reason: SDUPTHER

## 2022-11-01 RX ORDER — POTASSIUM CHLORIDE 20 MEQ/1
40 TABLET, EXTENDED RELEASE ORAL ONCE
Status: COMPLETED | OUTPATIENT
Start: 2022-11-01 | End: 2022-11-01

## 2022-11-01 RX ORDER — SODIUM CHLORIDE 0.9 % (FLUSH) 0.9 %
5-40 SYRINGE (ML) INJECTION EVERY 12 HOURS SCHEDULED
Status: DISCONTINUED | OUTPATIENT
Start: 2022-11-01 | End: 2022-11-02 | Stop reason: HOSPADM

## 2022-11-01 RX ORDER — LIDOCAINE HYDROCHLORIDE 10 MG/ML
5 INJECTION, SOLUTION EPIDURAL; INFILTRATION; INTRACAUDAL; PERINEURAL ONCE
Status: COMPLETED | OUTPATIENT
Start: 2022-11-01 | End: 2022-11-01

## 2022-11-01 RX ORDER — POTASSIUM CHLORIDE 29.8 MG/ML
20 INJECTION INTRAVENOUS
Status: DISCONTINUED | OUTPATIENT
Start: 2022-11-01 | End: 2022-11-01 | Stop reason: SDUPTHER

## 2022-11-01 RX ORDER — MIRTAZAPINE 15 MG/1
15 TABLET, FILM COATED ORAL NIGHTLY
Status: DISCONTINUED | OUTPATIENT
Start: 2022-11-01 | End: 2022-11-02 | Stop reason: HOSPADM

## 2022-11-01 RX ORDER — AMILORIDE HYDROCHLORIDE 5 MG/1
5 TABLET ORAL DAILY
Status: COMPLETED | OUTPATIENT
Start: 2022-11-01 | End: 2022-11-01

## 2022-11-01 RX ORDER — POTASSIUM CHLORIDE 20 MEQ/1
40 TABLET, EXTENDED RELEASE ORAL
Status: DISCONTINUED | OUTPATIENT
Start: 2022-11-01 | End: 2022-11-01

## 2022-11-01 RX ORDER — MIRTAZAPINE 15 MG/1
30 TABLET, FILM COATED ORAL NIGHTLY
Status: DISCONTINUED | OUTPATIENT
Start: 2022-11-01 | End: 2022-11-01

## 2022-11-01 RX ORDER — SODIUM CHLORIDE 9 MG/ML
INJECTION, SOLUTION INTRAVENOUS PRN
Status: DISCONTINUED | OUTPATIENT
Start: 2022-11-01 | End: 2022-11-02 | Stop reason: HOSPADM

## 2022-11-01 RX ORDER — ALBUTEROL SULFATE 2.5 MG/3ML
2.5 SOLUTION RESPIRATORY (INHALATION) EVERY 4 HOURS PRN
Status: DISCONTINUED | OUTPATIENT
Start: 2022-11-01 | End: 2022-11-02 | Stop reason: HOSPADM

## 2022-11-01 RX ORDER — SODIUM CHLORIDE 0.9 % (FLUSH) 0.9 %
5-40 SYRINGE (ML) INJECTION EVERY 12 HOURS SCHEDULED
Status: DISCONTINUED | OUTPATIENT
Start: 2022-11-01 | End: 2022-11-01 | Stop reason: SDUPTHER

## 2022-11-01 RX ORDER — SODIUM CHLORIDE 0.9 % (FLUSH) 0.9 %
5-40 SYRINGE (ML) INJECTION PRN
Status: DISCONTINUED | OUTPATIENT
Start: 2022-11-01 | End: 2022-11-02 | Stop reason: HOSPADM

## 2022-11-01 RX ORDER — SODIUM CHLORIDE 9 MG/ML
INJECTION, SOLUTION INTRAVENOUS PRN
Status: DISCONTINUED | OUTPATIENT
Start: 2022-11-01 | End: 2022-11-01 | Stop reason: SDUPTHER

## 2022-11-01 RX ADMIN — PAROXETINE HYDROCHLORIDE 30 MG: 20 TABLET, FILM COATED ORAL at 08:35

## 2022-11-01 RX ADMIN — ALBUTEROL SULFATE 2 PUFF: 90 AEROSOL, METERED RESPIRATORY (INHALATION) at 15:14

## 2022-11-01 RX ADMIN — SODIUM CHLORIDE, PRESERVATIVE FREE 10 ML: 5 INJECTION INTRAVENOUS at 13:44

## 2022-11-01 RX ADMIN — POTASSIUM CHLORIDE 40 MEQ: 1500 TABLET, EXTENDED RELEASE ORAL at 08:36

## 2022-11-01 RX ADMIN — AMILORIDE HYDROCLORIDE 5 MG: 5 TABLET ORAL at 13:40

## 2022-11-01 RX ADMIN — OXYCODONE AND ACETAMINOPHEN 1 TABLET: 5; 325 TABLET ORAL at 11:36

## 2022-11-01 RX ADMIN — POTASSIUM BICARBONATE 40 MEQ: 782 TABLET, EFFERVESCENT ORAL at 13:48

## 2022-11-01 RX ADMIN — ALBUTEROL SULFATE 2 PUFF: 90 AEROSOL, METERED RESPIRATORY (INHALATION) at 20:09

## 2022-11-01 RX ADMIN — METOPROLOL SUCCINATE 25 MG: 25 TABLET, FILM COATED, EXTENDED RELEASE ORAL at 08:36

## 2022-11-01 RX ADMIN — GABAPENTIN 300 MG: 300 CAPSULE ORAL at 20:41

## 2022-11-01 RX ADMIN — POTASSIUM CHLORIDE 10 MEQ: 7.46 INJECTION, SOLUTION INTRAVENOUS at 18:03

## 2022-11-01 RX ADMIN — MIRTAZAPINE 15 MG: 15 TABLET, FILM COATED ORAL at 20:42

## 2022-11-01 RX ADMIN — POTASSIUM BICARBONATE 40 MEQ: 782 TABLET, EFFERVESCENT ORAL at 17:44

## 2022-11-01 RX ADMIN — AZITHROMYCIN MONOHYDRATE 500 MG: 250 TABLET ORAL at 15:33

## 2022-11-01 RX ADMIN — POTASSIUM CHLORIDE 10 MEQ: 7.45 INJECTION INTRAVENOUS at 02:08

## 2022-11-01 RX ADMIN — BUSPIRONE HYDROCHLORIDE 20 MG: 5 TABLET ORAL at 08:36

## 2022-11-01 RX ADMIN — POTASSIUM CHLORIDE 10 MEQ: 7.45 INJECTION INTRAVENOUS at 00:17

## 2022-11-01 RX ADMIN — IMATINIB MESYLATE 400 MG: 400 TABLET, FILM COATED ORAL at 08:39

## 2022-11-01 RX ADMIN — SODIUM CHLORIDE, PRESERVATIVE FREE 10 ML: 5 INJECTION INTRAVENOUS at 11:34

## 2022-11-01 RX ADMIN — ASPIRIN 81 MG: 81 TABLET ORAL at 08:38

## 2022-11-01 RX ADMIN — POTASSIUM CHLORIDE 10 MEQ: 7.46 INJECTION, SOLUTION INTRAVENOUS at 15:06

## 2022-11-01 RX ADMIN — LIDOCAINE HYDROCHLORIDE 5 ML: 10 INJECTION, SOLUTION EPIDURAL; INFILTRATION; INTRACAUDAL; PERINEURAL at 13:42

## 2022-11-01 RX ADMIN — ALBUTEROL SULFATE 2 PUFF: 90 AEROSOL, METERED RESPIRATORY (INHALATION) at 07:33

## 2022-11-01 RX ADMIN — LIDOCAINE HYDROCHLORIDE 5 ML: 10 INJECTION, SOLUTION EPIDURAL; INFILTRATION; INTRACAUDAL; PERINEURAL at 16:35

## 2022-11-01 RX ADMIN — OXYCODONE AND ACETAMINOPHEN 1 TABLET: 5; 325 TABLET ORAL at 20:41

## 2022-11-01 RX ADMIN — POTASSIUM CHLORIDE 10 MEQ: 7.46 INJECTION, SOLUTION INTRAVENOUS at 20:54

## 2022-11-01 RX ADMIN — POTASSIUM CHLORIDE 10 MEQ: 7.46 INJECTION, SOLUTION INTRAVENOUS at 09:14

## 2022-11-01 RX ADMIN — CEFTRIAXONE SODIUM 1000 MG: 1 INJECTION, POWDER, FOR SOLUTION INTRAMUSCULAR; INTRAVENOUS at 15:36

## 2022-11-01 RX ADMIN — ALBUTEROL SULFATE 2.5 MG: 2.5 SOLUTION RESPIRATORY (INHALATION) at 00:52

## 2022-11-01 RX ADMIN — POTASSIUM CHLORIDE 10 MEQ: 7.45 INJECTION INTRAVENOUS at 05:26

## 2022-11-01 ASSESSMENT — PAIN DESCRIPTION - ORIENTATION
ORIENTATION: RIGHT
ORIENTATION: LOWER

## 2022-11-01 ASSESSMENT — PAIN SCALES - GENERAL
PAINLEVEL_OUTOF10: 9
PAINLEVEL_OUTOF10: 9

## 2022-11-01 ASSESSMENT — PAIN DESCRIPTION - DESCRIPTORS
DESCRIPTORS: BURNING
DESCRIPTORS: SHOOTING;STABBING

## 2022-11-01 ASSESSMENT — PAIN DESCRIPTION - LOCATION
LOCATION: BACK
LOCATION: ARM;BACK

## 2022-11-01 NOTE — PROGRESS NOTES
Physician Progress Note      PATIENT:               Lizzeth Hartley  CSN #:                  [id number]  :                       1960  ADMIT DATE:       10/31/2022 11:25 AM  DISCH DATE:  RESPONDING  PROVIDER #:        Willis Millan MD          QUERY TEXT:    Hospitalists,    Pt admitted with PNA and has SIRS documented. Pt noted to have + Influenza A   test results. If possible, please document in the progress notes and discharge   summary if you are evaluating and /or treating any of the following: The medical record reflects the following:  Risk Factors: PNA  Clinical Indicators: +SIRS w/ HR >100, WBC 12, ALVARO  Treatment: labs, imaging, IVF, IV Rocephin & Vibramycin    Thank you,  Vicki Abbott RN CDS  [phone]  Options provided:  -- Sepsis, present on admission  -- Sepsis, present on admission, now resolved  -- Sepsis, not present on admission  -- Sepsis was ruled out  -- Other - I will add my own diagnosis  -- Disagree - Not applicable / Not valid  -- Disagree - Clinically unable to determine / Unknown  -- Refer to Clinical Documentation Reviewer    PROVIDER RESPONSE TEXT:    This patient has sepsis which was present on admission.     Query created by: Cornell Denton on 2022 10:15 AM      Electronically signed by:  Willis Millan MD 2022 3:01 PM

## 2022-11-01 NOTE — CONSULTS
Nephrology Service Consultation    Patient:  Juni Piedra  MRN: [id number]  Consulting physician:  Ambrose Simons, *  Reason for Consult: Hypokalemia    History Obtained From:  patient, electronic medical record daughter  PCP: Timothy Walton    HISTORY OF PRESENT ILLNESS:   The patient is a 63 y.o. female who presents with known history of CML on Gleevec history of DVT/thrombus, valvular heart disease status post TAVR ex-smoker with COPD EF 50 to 55% and history of ovarian cancer with prior acute renal failure that is resolved states not feeling well for the last few days was on diuretics in the past but not recently she states although it is on her med list not eating well with dry heaves poor intake overall and presents to the hospital for work-up and evaluation noted to have acute renal failure with hypokalemia. Patient diagnosed with influenza A by PCR and admitted for work-up and therapy. While seeing patient patient's peripheral IV can try to get IV potassium burning her arms and unable to maintain. Still nauseous not wanting to take much oral intake and family in the room with her and they are concerned she is little bit of confusion as well in the above setting.     Past Medical History:        Diagnosis Date    Aortic valve disease     CML (chronic myelocytic leukemia) ([address]     H/O cardiac catheterization 08/04/2017    normal arteries, severe AS    H/O echocardiogram 05/2018    EF50-55% prostetic AV, mild Mr, trace-mild TR    Leukemia ([address]     Leukemia ([address]     Lung mass     Ovarian cancer [facility])        Past Surgical History:        Procedure Laterality Date    AORTIC VALVE REPLACEMENT  09/25/2017    TAVR-[place]    BONE MARROW BIOPSY  feb 2012    CHOLECYSTECTOMY      DENTAL SURGERY      HYSTERECTOMY (CERVIX STATUS UNKNOWN)      THORACOTOMY         Medications:   Scheduled Meds:   potassium chloride  10 mEq IntraVENous Q1H    sodium chloride flush  5-40 mL IntraVENous 2 times per day    enoxaparin  40 mg SubCUTAneous Daily    albuterol  2.5 mg Nebulization Q4H WA    cefTRIAXone (ROCEPHIN) IV  1,000 mg IntraVENous Q24H    And    azithromycin  500 mg Oral Q24H    [Held by provider] furosemide  20 mg Oral Daily    gabapentin  300 mg Oral Nightly    imatinib  400 mg Oral Daily    metoprolol succinate  25 mg Oral Daily    PARoxetine  30 mg Oral Daily    busPIRone  20 mg Oral Daily    aspirin  81 mg Oral Daily    albuterol sulfate HFA  2 puff Inhalation Q4H WA     Continuous Infusions:   sodium chloride       PRN Meds:.sodium chloride flush, sodium chloride, polyethylene glycol, senna, acetaminophen **OR** acetaminophen, ipratropium-albuterol, prochlorperazine **OR** prochlorperazine, potassium chloride **OR** potassium alternative oral replacement **OR** potassium chloride, oxyCODONE-acetaminophen **AND** oxyCODONE    Allergies:  Ampicillin, Bee venom, Penicillins, Typhoid vaccines, Codeine, Morphine, Tape [adhesive tape], and Vicodin [hydrocodone-acetaminophen]    Social History:   TOBACCO:   reports that she has quit smoking. She has a 19.00 pack-year smoking history. She has never used smokeless tobacco.  ETOH:   reports no history of alcohol use. OCCUPATION:      Family History:       Problem Relation Age of Onset    Cancer Mother     Kidney Disease Mother     Heart Disease Maternal Grandmother        REVIEW OF SYSTEMS:  Negative except for weak tired poor intake positive influenza. Physical Exam:    I/O: 10/31 0701 - 11/01 0700  In: 120 [P.O.:120]  Out: -     Vitals: /62   Pulse 70   Temp 97.7 °F (36.5 °C)   Resp 14   Ht 5' 6\" (1.676 m)   Wt 197 lb (89.4 kg)   SpO2 95%   BMI 31.80 kg/m²   General appearance: awake weak  HEENT: Head: Normal, normocephalic, atraumatic.   Neck: supple, symmetrical, trachea midline  Lungs: diminished breath sounds bilaterally  Heart: S1, S2 normal  Abdomen: abnormal findings:  soft NT  Extremities: edema trace burning in arm from IV  Neurologic: Mental status: alertness: Awake anxious      CBC:   Recent Labs     10/31/22  1145 11/01/22  0554   WBC 12.0* 9.3   HGB 13.8 10.9*    171     BMP:    Recent Labs     10/31/22  1145 10/31/22  1947 11/01/22  0554   * 135 135   K 2.7* 2.3* 2.7*   CL 95* 97* 99   CO2 26 27 26   BUN 31* 27* 22   CREATININE 1.4* 1.4* 1.2*   GLUCOSE 128* 122* 112*     Hepatic:   Recent Labs     10/31/22  1145   AST 13*   ALT 17   BILITOT 0.7   ALKPHOS 92     Troponin: No results for input(s): TROPONINI in the last 72 hours. BNP: No results for input(s): BNP in the last 72 hours. Lipids: No results for input(s): CHOL, HDL in the last 72 hours. Invalid input(s): LDLCALCU  ABGs: No results found for: PHART, PO2ART, AEZ8NEX  INR: No results for input(s): INR in the last 72 hours.   Renal Labs  Albumin:    Lab Results   Component Value Date/Time    LABALBU 4.3 10/31/2022 11:45 AM     Calcium:    Lab Results   Component Value Date/Time    CALCIUM 8.0 11/01/2022 05:54 AM     Phosphorus:  No results found for: PHOS  U/A:    Lab Results   Component Value Date/Time    NITRU NEGATIVE 01/17/2014 04:50 PM    COLORU YELLOW 01/17/2014 04:50 PM    WBCUA <1 01/17/2014 04:50 PM    RBCUA <1 01/17/2014 04:50 PM    MUCUS RARE 01/17/2014 04:50 PM    BACTERIA NEGATIVE 01/17/2014 04:50 PM    CLARITYU CLEAR 01/17/2014 04:50 PM    SPECGRAV 1.017 01/17/2014 04:50 PM    UROBILINOGEN 2.0 01/17/2014 04:50 PM    BILIRUBINUR NEGATIVE 01/17/2014 04:50 PM    BLOODU NEGATIVE 01/17/2014 04:50 PM    KETUA NEGATIVE 01/17/2014 04:50 PM     ABG:  No results found for: PHART, QVO3YRQ, PO2ART, XWZ9VBW, BEART, THGBART, EYI8XSE, Z3NDIYTZ  HgBA1c:  No results found for: LABA1C  Microalbumen/Creatinine ratio:  No components found for: RUCREAT  TSH:  No results found for: TSH  IRON:  No results found for: IRON  Iron Saturation:  No components found for: PERCENTFE  TIBC:  No results found for: TIBC  FERRITIN:  No results found for: FERRITIN  RPR:  No results found for: RPR  DEBBI:  No results found for: ANATITER, DEBBI  24 Hour Urine for Creatinine Clearance:  No components found for: CREAT4, UHRS10, UTV10  -----------------------------------------------------------------      Assessment and Recommendations     Patient Active Problem List   Diagnosis Code    Pneumonia J18.9    Nausea & vomiting R11.2    Abdominal pain R10.9    CML (chronic myeloid leukemia) ([facility]) C92.10    Pneumothorax J93.9    Rheumatic aortic stenosis I06.0    Chest pain R07.9    Radial artery thrombosis ([facility]) I74.2    Right radial artery thrombus ([facility]) I74.2    Community acquired pneumonia, bilateral J18.9     Impression plan  #1 shortness of breath with positive influenza  #2 history of TAVR  #3 CML on Gleevec  #4 anxiety/depression  #5 hypokalemia  #6 hyponatremia  #7 acute renal failure from volume depletion    Plan  #1 breathing treatments as needed respiratory isolation follow-up cultures  #2 cardiac status monitor hold on diuretic use  #3 if able to take p.o. intake and resume back on Gleevec  #4 monitor anxiety use BuSpar for stress supportive care monitor affect  #5 replete potassium with p.o. and IV potassium place a PICC line for IV potassium use and give 1 dose of amiloride x1  #6 sodium monitor low stable  #7 acute renal failure slowly improving held diuretics supportive care monitor    Discussed with patient as well as daughter and son-in-law agree with current treatment supportive care place IV access with a PICC line if family agrees as well as patient and replete potassium and monitor closely  Will follow

## 2022-11-01 NOTE — CARE COORDINATION
CM into see pt to initiate a safe discharge plan. Cm introduced self and explained role of CM. Pt is kind, alert and oriented. Pt lives with her son and dtr in law and their children. Pt is independent in home with walker, cane and shower chr as needed. Pt goes to the [place] [id number]. Pt is able to obtain her medications. Pt has insurance. Pt would like home care and does not have a preference. CM made a referral to [facility] to follow. Discharge plan is for home with family. [facility]. [facility]. PCP. [facility] to see. CM provided card and encouraged to call for any needs or concern. CM is available if any needs arise.

## 2022-11-01 NOTE — CONSULTS
Patient has history of TAVR we will check an echocardiogram                        Name:  Nallely Montana /Age/Sex: 1960  ([address] y.o. female)   MRN & CSN:  [id number] & [id number] Admission Date/Time: 10/31/2022 11:25 AM   Location:  [facility] PCP: Ary Li Day: 2          Referring physician:  Jamee Plunkett MD         Reason for consultation:  chf        Thanks for referral.    Information source: patient    CC;  nausea      HPI:   Thank you for involving me in taking  care of Nallely Montana who  is a [address] y. o.year  Old female  Presents with history of CML, pneumothorax, history of TAVR admitted with complains of cough was also having nausea has been vomiting as well has no chest pain or shortness of breath cardiology has been consulted given that the patient has history of for TAVR and there is a possibility of CHF given that the BNP is mildly elevated                     Past medical history:    has a past medical history of Aortic valve disease, CML (chronic myelocytic leukemia) ([facility] [address] H/O cardiac catheterization, H/O echocardiogram, Leukemia ([facility] [address] Leukemia ([facility] [address] Lung mass, and Ovarian cancer ([facility] [address] Past surgical history:   has a past surgical history that includes Hysterectomy; bone marrow biopsy (2012); Cholecystectomy; Dental surgery; thoracotomy; and Aortic valve replacement (2017). Social History:   reports that she has quit smoking. She has a 19.00 pack-year smoking history. She has never used smokeless tobacco. She reports current drug use. Drug: Marijuana Dimas Chowdhury). She reports that she does not drink alcohol. Family history:  family history includes Cancer in her mother; Heart Disease in her maternal grandmother; Kidney Disease in her mother. Allergies   Allergen Reactions    Ampicillin Other (See Comments)     Vein start to burn and swelling.  Entire arm     Bee Venom Anaphylaxis    Penicillins Swelling and Rash    Typhoid Vaccines Other (See Comments) Pt cannot remember     Codeine Nausea And Vomiting    Morphine Nausea And Vomiting    Tape Carry Otter Tape] Rash    Vicodin [Hydrocodone-Acetaminophen] Nausea And Vomiting       lidocaine PF 1 % injection 5 mL, Once  sodium chloride flush 0.9 % injection 5-40 mL, 2 times per day  sodium chloride flush 0.9 % injection 5-40 mL, PRN  0.9 % sodium chloride infusion, PRN  aMILoride (MIDAMOR) tablet 5 mg, Daily  potassium bicarb-citric acid (EFFER-K) effervescent tablet 40 mEq, TID AC  sodium chloride flush 0.9 % injection 5-40 mL, 2 times per day  sodium chloride flush 0.9 % injection 5-40 mL, PRN  0.9 % sodium chloride infusion, PRN  enoxaparin (LOVENOX) injection 40 mg, Daily  polyethylene glycol (GLYCOLAX) packet 17 g, Daily PRN  senna (SENOKOT) tablet 8.6 mg, Daily PRN  acetaminophen (TYLENOL) tablet 650 mg, Q6H PRN   Or  acetaminophen (TYLENOL) suppository 650 mg, Q6H PRN  albuterol (PROVENTIL) nebulizer solution 2.5 mg, Q4H WA  ipratropium-albuterol (DUONEB) nebulizer solution 1 ampule, Q4H PRN  cefTRIAXone (ROCEPHIN) 1,000 mg in dextrose 5 % 50 mL IVPB mini-bag, Q24H   And  azithromycin (ZITHROMAX) tablet 500 mg, Q24H  gabapentin (NEURONTIN) capsule 300 mg, Nightly  imatinib (GLEEVEC) chemo tablet 400 mg (PATIENT SUPPLIED) (Patient Supplied), Daily  metoprolol succinate (TOPROL XL) extended release tablet 25 mg, Daily  PARoxetine (PAXIL) tablet 30 mg, Daily  busPIRone (BUSPAR) tablet 20 mg, Daily  aspirin EC tablet 81 mg, Daily  prochlorperazine (COMPAZINE) tablet 10 mg, Q6H PRN   Or  prochlorperazine (COMPAZINE) injection 10 mg, Q6H PRN  potassium chloride (KLOR-CON M) extended release tablet 40 mEq, PRN   Or  potassium bicarb-citric acid (EFFER-K) effervescent tablet 40 mEq, PRN   Or  potassium chloride 10 mEq/100 mL IVPB (Peripheral Line), PRN  oxyCODONE-acetaminophen (PERCOCET) 5-325 MG per tablet 1 tablet, Q4H PRN   And  oxyCODONE (ROXICODONE) immediate release tablet 5 mg, Q4H PRN  albuterol sulfate HFA (PROVENTIL;VENTOLIN;PROAIR) 108 (90 Base) MCG/ACT inhaler 2 puff, Q4H WA      Current Facility-Administered Medications   Medication Dose Route Frequency Provider Last Rate Last Admin    lidocaine PF 1 % injection 5 mL  5 mL IntraDERmal Once Shahbaz Dumas MD        sodium chloride flush 0.9 % injection 5-40 mL  5-40 mL IntraVENous 2 times per day Shahbaz Wolf MD        sodium chloride flush 0.9 % injection 5-40 mL  5-40 mL IntraVENous PRN Shahbaz Dumas MD        0.9 % sodium chloride infusion   IntraVENous PRN Shahbaz Wolf MD        aMILoride (MIDAMOR) tablet 5 mg  5 mg Oral Daily Shahbaz Dumas MD        potassium bicarb-citric acid (EFFER-K) effervescent tablet 40 mEq  40 mEq Oral TID AC Shahbaz Dumas MD        sodium chloride flush 0.9 % injection 5-40 mL  5-40 mL IntraVENous 2 times per day Claudine Patel PA-C   10 mL at 11/01/22 1134    sodium chloride flush 0.9 % injection 5-40 mL  5-40 mL IntraVENous PRN Todd Melgar PA-C        0.9 % sodium chloride infusion  25 mL IntraVENous PRN Claudine Patel PA-C        enoxaparin (LOVENOX) injection 40 mg  40 mg SubCUTAneous Daily Claudine Patel PA-C        polyethylene glycol (GLYCOLAX) packet 17 g  17 g Oral Daily PRN Claudine Patel PA-C        senna (SENOKOT) tablet 8.6 mg  1 tablet Oral Daily PRN Claudine Patel PA-C        acetaminophen (TYLENOL) tablet 650 mg  650 mg Oral Q6H PRN Claudine Patel PA-C        Or    acetaminophen (TYLENOL) suppository 650 mg  650 mg Rectal Q6H PRN Todd Melgar PA-C        albuterol (PROVENTIL) nebulizer solution 2.5 mg  2.5 mg Nebulization Q4H WA Claudine Patel PA-C   2.5 mg at 11/01/22 0052    ipratropium-albuterol (DUONEB) nebulizer solution 1 ampule  1 ampule Inhalation Q4H PRN Todd Melgar PA-C        cefTRIAXone (ROCEPHIN) 1,000 mg in dextrose 5 % 50 mL IVPB mini-bag  1,000 mg IntraVENous Q24H Claudine Patel PA-C        And    azithromycin (ZITHROMAX) tablet 500 mg  500 mg Oral Q24H Todd Melgar, PA-VERONIKA        gabapentin (NEURONTIN) capsule 300 mg  300 mg Oral Nightly Claudine Patel PA-C   300 mg at 10/31/22 2023    imatinib (GLEEVEC) chemo tablet 400 mg (PATIENT SUPPLIED) (Patient Supplied)  400 mg Oral Daily Claudine Patel PA-C        metoprolol succinate (TOPROL XL) extended release tablet 25 mg  25 mg Oral Daily Shahbaz Ward MD   25 mg at 11/01/22 0836    PARoxetine (PAXIL) tablet 30 mg  30 mg Oral Daily Claudine Patel PA-C   30 mg at 11/01/22 0835    busPIRone (BUSPAR) tablet 20 mg  20 mg Oral Daily ALEX RuddC   20 mg at 11/01/22 4173    aspirin EC tablet 81 mg  81 mg Oral Daily EVONNE Rudd-C   81 mg at 11/01/22 6131    prochlorperazine (COMPAZINE) tablet 10 mg  10 mg Oral Q6H PRN YuliaEVONNE Mckinney-C        Or    prochlorperazine (COMPAZINE) injection 10 mg  10 mg IntraVENous Q6H PRN Claudine Patel PA-C        potassium chloride (KLOR-CON M) extended release tablet 40 mEq  40 mEq Oral PRN Yulia Jun, PA-C        Or    potassium bicarb-citric acid (EFFER-K) effervescent tablet 40 mEq  40 mEq Oral PRN Yulia Vern, PA-C        Or    potassium chloride 10 mEq/100 mL IVPB (Peripheral Line)  10 mEq IntraVENous PRN Yulia Jun, PA-C 100 mL/hr at 11/01/22 0526 10 mEq at 11/01/22 0526    oxyCODONE-acetaminophen (PERCOCET) 5-325 MG per tablet 1 tablet  1 tablet Oral Q4H PRN Yulia Vern, PA-C   1 tablet at 11/01/22 1136    And    oxyCODONE (ROXICODONE) immediate release tablet 5 mg  5 mg Oral Q4H PRN Yulia Vern, PA-C        albuterol sulfate HFA (PROVENTIL;VENTOLIN;PROAIR) 108 (90 Base) MCG/ACT inhaler 2 puff  2 puff Inhalation Q4H NEHA Victor MD   2 puff at 11/01/22 2324     Review of Systems:  All 14 systems reviewed, all negative except for cough    Physical Examination:    /62   Pulse 70   Temp 97.7 °F (36.5 °C)   Resp 14   Ht 5' 6\" (1.676 m)   Wt 197 lb (89.4 kg)   SpO2 95%   BMI 31.80 kg/m²      Wt Readings from Last 3 Encounters:   10/31/22 197 lb (89.4 kg)   04/19/22 197 lb 3.2 oz (89.4 kg)   05/24/18 211 lb (95.7 kg)     Body mass index is 31.8 kg/m². General Appearance: Fair  Head: normocephalic     Eyes: normal, noninjected conjunctiva    ENT: normal mucosa, noninjected throat, normal     NECK: No JVP  No thyromegaly        Cardiovascular: No thrills palpated   Auscultation: Normal S1 and S2, no murmur   carotid bruit no   Abdominal Aorta no bruit    Respiratory:    Breath sounds clear    Extremities: No edema clubbing ,   no cyanosis    SKIN: Warm and well perfused, no pallor or cyanosis    Vascular exam:  Pedal Pulses: Palp bilaterally        Abdomen:  No masses or tenderness. No organomegaly noted. Neurological:  Oriented to time, place, and person   No focal neurological deficit noted.   Psychiatric:normal mood, no anxiety    Lab Review   Recent Results (from the past 24 hour(s))   COVID-19, Rapid    Collection Time: 10/31/22 12:22 PM    Specimen: Nasopharyngeal   Result Value Ref Range    SARS-CoV-2, NAAT NOT DETECTED NOT DETECTED   Rapid influenza A/B antigens    Collection Time: 10/31/22 12:22 PM    Specimen: Nasopharyngeal   Result Value Ref Range    Rapid Influenza A Ag NEGATIVE NEGATIVE    Rapid Influenza B Ag NEGATIVE NEGATIVE   EKG 12 Lead    Collection Time: 10/31/22 12:31 PM   Result Value Ref Range    Ventricular Rate 101 BPM    Atrial Rate 163 BPM    QRS Duration 86 ms    Q-T Interval 480 ms    QTc Calculation (Bazett) 622 ms    P Axis 75 degrees    R Axis 29 degrees    T Axis 166 degrees    Diagnosis       Normal sinus rhythm with frequent and consequtive PACs and PVCs  ST & T wave abnormality, consider inferior ischemia  Abnormal ECG  When compared with ECG of 27-SEP-2017 21:28,  Current undetermined rhythm precludes rhythm comparison, needs review  T wave inversion now evident in Inferior leads  Nonspecific T wave abnormality no longer evident in Anterior leads  Nonspecific T wave abnormality now evident in Lateral leads  QT has lengthened  Confirmed by [facility] MD, [id number] [facility] ([id number]) on 10/31/2022 9:26:34 PM     EKG 12 Lead    Collection Time: 10/31/22  2:40 PM   Result Value Ref Range    Ventricular Rate 94 BPM    Atrial Rate 94 BPM    P-R Interval 130 ms    QRS Duration 84 ms    Q-T Interval 518 ms    QTc Calculation (Bazett) 647 ms    P Axis 57 degrees    R Axis -6 degrees    T Axis 63 degrees    Diagnosis       Sinus rhythm with frequent premature ventricular complexes  Nonspecific ST abnormality  Abnormal ECG  When compared with ECG of 31-OCT-2022 12:31,  Previous ECG has undetermined rhythm, needs review  T wave inversion no longer evident in Inferior leads  Confirmed by [facility] MD, [id number] [facility] ([id number]) on 10/31/2022 9:32:19 PM     Respiratory Panel, Molecular, with COVID-19 (Restricted: peds pts or suitable admitted adults)    Collection Time: 10/31/22  7:00 PM    Specimen: Nasopharyngeal   Result Value Ref Range    Adenovirus Detection by PCR NOT DETECTED NOT DETECTED    Coronavirus 229E PCR NOT DETECTED NOT DETECTED    Coronavirus HKU1 PCR NOT DETECTED NOT DETECTED    Coronavirus NL63 PCR NOT DETECTED NOT DETECTED    Coronavirus OC43 PCR NOT DETECTED NOT DETECTED    SARS-CoV-2 NOT DETECTED NOT DETECTED    Human Metapneumovirus PCR NOT DETECTED NOT DETECTED    Rhinovirus Enterovirus PCR NOT DETECTED NOT DETECTED    Influenza A by PCR DETECTED BY PCR (A) NOT DETECTED    Influenza A H1 Pandemic PCR NOT DETECTED NOT DETECTED    Influenza A H1 (2009) PCR NOT DETECTED NOT DETECTED    Influenza A H3 PCR DETECTED BY PCR (A) NOT DETECTED    Influenza B by PCR NOT DETECTED NOT DETECTED    Parainfluenza 1 PCR NOT DETECTED NOT DETECTED    Parainfluenza 2 PCR NOT DETECTED NOT DETECTED    Parainfluenza 3 PCR NOT DETECTED NOT DETECTED    Parainfluenza 4 PCR NOT DETECTED NOT DETECTED    RSV PCR NOT DETECTED NOT DETECTED    Bordetella parapertussis by PCR NOT DETECTED NOT DETECTED    B Pertussis by PCR NOT DETECTED NOT DETECTED    Chlamydophila Pneumonia PCR NOT DETECTED NOT DETECTED    Mycoplasma pneumo by PCR NOT DETECTED NOT DETECTED   Troponin    Collection Time: 10/31/22  7:47 PM   Result Value Ref Range    Troponin T 0.055 (H) <0.01 NG/ML   Basic Metabolic Panel w/ Reflex to MG    Collection Time: 10/31/22  7:47 PM   Result Value Ref Range    Sodium 135 135 - 145 MMOL/L    Potassium 2.3 (LL) 3.5 - 5.1 MMOL/L    Chloride 97 (L) 99 - 110 mMol/L    CO2 27 21 - 32 MMOL/L    Anion Gap 11 4 - 16    BUN 27 (H) 6 - 23 MG/DL    Creatinine 1.4 (H) 0.6 - 1.1 MG/DL    Est, Glom Filt Rate 43 (L) >60 mL/min/1.73m2    Glucose 122 (H) 70 - 99 MG/DL    Calcium 8.6 8.3 - 10.6 MG/DL   Magnesium    Collection Time: 10/31/22  7:47 PM   Result Value Ref Range    Magnesium 2.7 (H) 1.8 - 2.4 mg/dl   Troponin    Collection Time: 11/01/22  1:35 AM   Result Value Ref Range    Troponin T 0.052 (H) <0.01 NG/ML   CBC with Auto Differential    Collection Time: 11/01/22  5:54 AM   Result Value Ref Range    WBC 9.3 4.0 - 10.5 K/CU MM    RBC 3.47 (L) 4.2 - 5.4 M/CU MM    Hemoglobin 10.9 (L) 12.5 - 16.0 GM/DL    Hematocrit 29.8 (L) 37 - 47 %    MCV 85.9 78 - 100 FL    MCH 31.4 (H) 27 - 31 PG    MCHC 36.6 (H) 32.0 - 36.0 %    RDW 13.6 11.7 - 14.9 %    Platelets 485 760 - 032 K/CU MM    MPV 10.8 7.5 - 11.1 FL    Differential Type AUTOMATED DIFFERENTIAL     Segs Relative 71.9 (H) 36 - 66 %    Lymphocytes % 17.1 (L) 24 - 44 %    Monocytes % 8.7 (H) 0 - 4 %    Eosinophils % 1.4 0 - 3 %    Basophils % 0.3 0 - 1 %    Segs Absolute 6.7 K/CU MM    Lymphocytes Absolute 1.6 K/CU MM    Monocytes Absolute 0.8 K/CU MM    Eosinophils Absolute 0.1 K/CU MM    Basophils Absolute 0.0 K/CU MM    Nucleated RBC % 0.0 %    Total Nucleated RBC 0.0 K/CU MM    Total Immature Neutrophil 0.06 K/CU MM    Immature Neutrophil % 0.6 (H) 0 - 0.43 %   Basic Metabolic Panel w/ Reflex to MG    Collection Time: 11/01/22  5:54 AM   Result Value Ref Range    Sodium 135 135 - 145 MMOL/L    Potassium 2.7 (LL) 3.5 - 5.1 MMOL/L    Chloride 99 99 - 110 mMol/L    CO2 26 21 - 32 MMOL/L    Anion Gap 10 4 - 16    BUN 22 6 - 23 MG/DL    Creatinine 1.2 (H) 0.6 - 1.1 MG/DL    Est, Glom Filt Rate 52 (L) >60 mL/min/1.73m2    Glucose 112 (H) 70 - 99 MG/DL    Calcium 8.0 (L) 8.3 - 10.6 MG/DL   Troponin    Collection Time: 11/01/22  5:54 AM   Result Value Ref Range    Troponin T 0.041 (H) <0.01 NG/ML   Magnesium    Collection Time: 11/01/22  5:54 AM   Result Value Ref Range    Magnesium 2.5 (H) 1.8 - 2.4 mg/dl   Urinalysis    Collection Time: 11/01/22  9:00 AM   Result Value Ref Range    Color, UA YELLOW YELLOW    Clarity, UA CLEAR CLEAR    Glucose, Urine NEGATIVE NEGATIVE MG/DL    Bilirubin Urine NEGATIVE NEGATIVE MG/DL    Ketones, Urine NEGATIVE NEGATIVE MG/DL    Specific Gravity, UA <1.005 1.001 - 1.035    Blood, Urine NEGATIVE NEGATIVE    pH, Urine 6.5 5.0 - 8.0    Protein, UA NEGATIVE NEGATIVE MG/DL    Urobilinogen, Urine 0.2 0.2 - 1.0 MG/DL    Nitrite Urine, Quantitative NEGATIVE NEGATIVE    Leukocyte Esterase, Urine NEGATIVE NEGATIVE           Assessment/Recommendations:     -Status post TAVR patient is stable had an echocardiogram done not too long ago which was unremarkable it was back in May we will get a limited echo  -BNP is elevated however the creatinine is mildly elevated as well  -Hypokalemia potassium of 2.7 needs corrected  -Anemia hemoglobin of 10.9 can cause high-output failure         Ray Noel MD, 11/1/2022 12:04 PM       Please note this report has been partially produced using speech recognition software and may contain errors related to that system including errors in grammar, punctuation, and spelling, as well as words and phrases that may be inappropriate. If there are any questions or concerns please feel free to contact the dictating provider for clarification.

## 2022-11-01 NOTE — CONSULTS
The following patient supplied medication(s) have been identified and labeled by the pharmacist for administration in the hospital... IMATINIB 400MG TABLETS (Melecio Andres)    Olu Almanza.  Michele Odom [facility]  11/1/2022 11:17 AM

## 2022-11-01 NOTE — CONSULTS
PICC Consult complete. Indication for line: Vesicant, unable to tolerate Potassium replacement via PIV or Midline. Education regarding PICC insertion discussed and risks and benefits assessed with patient. Patient verbalized understanding. Consent signed by patient. Time out done @ 1215. PICC inserted in RUE Cephalic Vessel without difficulty per protocol. Placement verified via VPSG4 monitoring system. Good blood return and flushes easily on both ports. Patient tolerated well. Education pamphlets left in chart  Ultrasound photos of vein diameter and doppler signature placed in chart. Please consult IV/PICC team if patient's needs change. PICC OK to use.

## 2022-11-01 NOTE — PROGRESS NOTES
Hospitalist Progress Note      Name:  Jaime Nelson /Age/Sex: 1960  (63 y.o. female)   MRN & CSN:  [id number] & [id number] Admission Date/Time: 10/31/2022 11:25 AM   Location:  [address] [place] [address]- PCP: Antoni Mcguire Day: 2    Assessment and Plan:   Jaime Nelson is a 63 y.o.  female  who presents with Community acquired pneumonia, bilateral      1. Bilateral community acquired pneumonia - SIRS +Ve. Positive for influenza. (Symptoms started 6 Days back) CXR with infiltrates. Not requiring oxygen. Ceftriaxone and Azithromycin. Blood cultures, sputum cuture, legionella antigen, strep pneumonia antigen    2. Hypokalemia likely 2/2 intractable N/V and poor oral intake. Supplemented. Will monitor     3. Acute kidney injury, in the setting of N/V/decreased oral intake - IV fluids. BMP daily. Since able to take orally, encouraged Oral hydration    4. Detectable troponin, prolonged Qtc likely from ALVARO. No chest pain. EKG with Qtc of 647. Troponin down trending    5. Hx of TAVR in 2017 at [facility] ASA, metoprolol. Hold lasix    6. History of radial artery thrombus    7. Chronic myeloid leukemia. Continue Gleevec    Other Co morbidities     History of ovarian cancer   Essential hypertension  Paroxysmal atrial fibrillation - Continue home metoprolol  Thoracic back pain - Continue home gabapentin     Diet ADULT DIET;  Regular   DVT Prophylaxis [] Lovenox, []  Heparin, [] SCDs, [] Ambulation   GI Prophylaxis [] PPI,  [] H2 Blocker,  [] Carafate,  [] Diet/Tube Feeds   Code Status Full Code   Disposition Patient requires continued admission due to Hypokalemia/ pneumonia   MDM [] Low, [] Moderate,[]  High  Patient's risk as above due to Multiple comorb     History of Present Illness:     Chief Complaint: Community acquired pneumonia, bilateral  Jaime Nelson is a 63 y.o.  female  who presents with  pmh of CML, thrombosis, pneumothorax, TAVR who presents to the ER for evaluation of cough and nausea for the past week. Reports Nausea / vomiting has resolved. Denies any abdominal pain or diarrhea. Ten point ROS reviewed negative, unless as noted above    Objective: Intake/Output Summary (Last 24 hours) at 11/1/2022 1502  Last data filed at 11/1/2022 0906  Gross per 24 hour   Intake 120 ml   Output 800 ml   Net -680 ml      Vitals:   Vitals:    11/01/22 1417   BP: 106/67   Pulse: 68   Resp: 16   Temp: 97.9 °F (36.6 °C)   SpO2: 97%     Physical Exam:   GEN Awake female, sitting upright in bed in no apparent distress. Appears given age. EYES Pupils are equally round. No scleral erythema, discharge, or conjunctivitis. HENT Mucous membranes are moist. Oral pharynx without exudates, no evidence of thrush. NECK Supple, no apparent thyromegaly or masses. RESP Clear to auscultation, no wheezes, rales or rhonchi. Symmetric chest movement while on room air. CARDIO/VASC S1/S2 auscultated. Regular rate without appreciable murmurs, rubs, or gallops. No JVD or carotid bruits. Peripheral pulses equal bilaterally and palpable. No peripheral edema. GI Abdomen is soft without significant tenderness, masses, or guarding. Bowel sounds are normoactive. Rectal exam deferred.  No costovertebral angle tenderness. Normal appearing external genitalia. Joseph catheter is not present. HEME/LYMPH No palpable cervical lymphadenopathy and no hepatosplenomegaly. No petechiae or ecchymoses. MSK No gross joint deformities. SKIN Normal coloration, warm, dry. NEURO Cranial nerves appear grossly intact, normal speech, no lateralizing weakness. PSYCH Awake, alert, oriented x 4. Affect appropriate.     Medications:   Medications:    sodium chloride flush  5-40 mL IntraVENous 2 times per day    potassium bicarb-citric acid  40 mEq Oral TID WC    potassium chloride  10 mEq IntraVENous Q2H    sodium chloride flush  5-40 mL IntraVENous 2 times per day    enoxaparin  40 mg SubCUTAneous Daily    albuterol  2.5 mg Nebulization Q4H WA    cefTRIAXone (ROCEPHIN) IV  1,000 mg IntraVENous Q24H    And    azithromycin  500 mg Oral Q24H    gabapentin  300 mg Oral Nightly    imatinib  400 mg Oral Daily    metoprolol succinate  25 mg Oral Daily    PARoxetine  30 mg Oral Daily    busPIRone  20 mg Oral Daily    aspirin  81 mg Oral Daily    albuterol sulfate HFA  2 puff Inhalation Q4H WA      Infusions:    sodium chloride      sodium chloride       PRN Meds: sodium chloride flush, 5-40 mL, PRN  sodium chloride, , PRN  sodium chloride flush, 5-40 mL, PRN  sodium chloride, 25 mL, PRN  polyethylene glycol, 17 g, Daily PRN  senna, 1 tablet, Daily PRN  acetaminophen, 650 mg, Q6H PRN   Or  acetaminophen, 650 mg, Q6H PRN  ipratropium-albuterol, 1 ampule, Q4H PRN  prochlorperazine, 10 mg, Q6H PRN   Or  prochlorperazine, 10 mg, Q6H PRN  potassium chloride, 40 mEq, PRN   Or  potassium alternative oral replacement, 40 mEq, PRN   Or  potassium chloride, 10 mEq, PRN  oxyCODONE-acetaminophen, 1 tablet, Q4H PRN   And  oxyCODONE, 5 mg, Q4H PRN          Patient is still admitted because Pneumonia. The anticipated discharge is in less than 24 hours.      Electronically signed by Bill Mills MD on 11/1/2022 at 3:02 PM

## 2022-11-02 ENCOUNTER — APPOINTMENT (OUTPATIENT)
Dept: GENERAL RADIOLOGY | Age: 62
DRG: 871 | End: 2022-11-02
Payer: OTHER GOVERNMENT

## 2022-11-02 VITALS
TEMPERATURE: 97.3 F | OXYGEN SATURATION: 95 % | BODY MASS INDEX: 31.66 KG/M2 | HEART RATE: 76 BPM | DIASTOLIC BLOOD PRESSURE: 60 MMHG | SYSTOLIC BLOOD PRESSURE: 97 MMHG | WEIGHT: 197 LBS | HEIGHT: 66 IN | RESPIRATION RATE: 20 BRPM

## 2022-11-02 LAB
ALBUMIN SERPL-MCNC: 3.2 GM/DL (ref 3.4–5)
ANION GAP SERPL CALCULATED.3IONS-SCNC: 9 MMOL/L (ref 4–16)
ANION GAP SERPL CALCULATED.3IONS-SCNC: 9 MMOL/L (ref 4–16)
BASOPHILS ABSOLUTE: 0 K/CU MM
BASOPHILS RELATIVE PERCENT: 0.5 % (ref 0–1)
BUN BLDV-MCNC: 16 MG/DL (ref 6–23)
BUN BLDV-MCNC: 17 MG/DL (ref 6–23)
CALCIUM SERPL-MCNC: 8.1 MG/DL (ref 8.3–10.6)
CALCIUM SERPL-MCNC: 8.2 MG/DL (ref 8.3–10.6)
CHLORIDE BLD-SCNC: 103 MMOL/L (ref 99–110)
CHLORIDE BLD-SCNC: 104 MMOL/L (ref 99–110)
CO2: 25 MMOL/L (ref 21–32)
CO2: 26 MMOL/L (ref 21–32)
CREAT SERPL-MCNC: 1.2 MG/DL (ref 0.6–1.1)
CREAT SERPL-MCNC: 1.3 MG/DL (ref 0.6–1.1)
DIFFERENTIAL TYPE: ABNORMAL
EOSINOPHILS ABSOLUTE: 0.4 K/CU MM
EOSINOPHILS RELATIVE PERCENT: 5.7 % (ref 0–3)
GFR SERPL CREATININE-BSD FRML MDRD: 47 ML/MIN/1.73M2
GFR SERPL CREATININE-BSD FRML MDRD: 52 ML/MIN/1.73M2
GLUCOSE BLD-MCNC: 107 MG/DL (ref 70–99)
GLUCOSE BLD-MCNC: 108 MG/DL (ref 70–99)
HCT VFR BLD CALC: 30.2 % (ref 37–47)
HEMOGLOBIN: 10.5 GM/DL (ref 12.5–16)
IMMATURE NEUTROPHIL %: 0.5 % (ref 0–0.43)
LV EF: 58 %
LVEF MODALITY: NORMAL
LYMPHOCYTES ABSOLUTE: 1.5 K/CU MM
LYMPHOCYTES RELATIVE PERCENT: 24.9 % (ref 24–44)
MAGNESIUM: 2.4 MG/DL (ref 1.8–2.4)
MCH RBC QN AUTO: 31.2 PG (ref 27–31)
MCHC RBC AUTO-ENTMCNC: 34.8 % (ref 32–36)
MCV RBC AUTO: 89.6 FL (ref 78–100)
MONOCYTES ABSOLUTE: 0.7 K/CU MM
MONOCYTES RELATIVE PERCENT: 11.5 % (ref 0–4)
NUCLEATED RBC %: 0 %
PDW BLD-RTO: 14.2 % (ref 11.7–14.9)
PHOSPHORUS: 2.9 MG/DL (ref 2.5–4.9)
PLATELET # BLD: 212 K/CU MM (ref 140–440)
PMV BLD AUTO: 10.6 FL (ref 7.5–11.1)
POTASSIUM SERPL-SCNC: 3.4 MMOL/L (ref 3.5–5.1)
POTASSIUM SERPL-SCNC: 3.5 MMOL/L (ref 3.5–5.1)
RBC # BLD: 3.37 M/CU MM (ref 4.2–5.4)
SEGMENTED NEUTROPHILS ABSOLUTE COUNT: 3.5 K/CU MM
SEGMENTED NEUTROPHILS RELATIVE PERCENT: 56.9 % (ref 36–66)
SODIUM BLD-SCNC: 138 MMOL/L (ref 135–145)
SODIUM BLD-SCNC: 138 MMOL/L (ref 135–145)
TOTAL IMMATURE NEUTOROPHIL: 0.03 K/CU MM
TOTAL NUCLEATED RBC: 0 K/CU MM
WBC # BLD: 6.2 K/CU MM (ref 4–10.5)

## 2022-11-02 PROCEDURE — 94761 N-INVAS EAR/PLS OXIMETRY MLT: CPT

## 2022-11-02 PROCEDURE — 6360000002 HC RX W HCPCS: Performed by: PHYSICIAN ASSISTANT

## 2022-11-02 PROCEDURE — 80048 BASIC METABOLIC PNL TOTAL CA: CPT

## 2022-11-02 PROCEDURE — 6370000000 HC RX 637 (ALT 250 FOR IP): Performed by: INTERNAL MEDICINE

## 2022-11-02 PROCEDURE — 2580000003 HC RX 258: Performed by: STUDENT IN AN ORGANIZED HEALTH CARE EDUCATION/TRAINING PROGRAM

## 2022-11-02 PROCEDURE — 36415 COLL VENOUS BLD VENIPUNCTURE: CPT

## 2022-11-02 PROCEDURE — APPSS60 APP SPLIT SHARED TIME 46-60 MINUTES: Performed by: NURSE PRACTITIONER

## 2022-11-02 PROCEDURE — 99232 SBSQ HOSP IP/OBS MODERATE 35: CPT | Performed by: INTERNAL MEDICINE

## 2022-11-02 PROCEDURE — 80069 RENAL FUNCTION PANEL: CPT

## 2022-11-02 PROCEDURE — 83735 ASSAY OF MAGNESIUM: CPT

## 2022-11-02 PROCEDURE — 93308 TTE F-UP OR LMTD: CPT

## 2022-11-02 PROCEDURE — 6370000000 HC RX 637 (ALT 250 FOR IP): Performed by: PHYSICIAN ASSISTANT

## 2022-11-02 PROCEDURE — 85025 COMPLETE CBC W/AUTO DIFF WBC: CPT

## 2022-11-02 PROCEDURE — 71045 X-RAY EXAM CHEST 1 VIEW: CPT

## 2022-11-02 PROCEDURE — 94640 AIRWAY INHALATION TREATMENT: CPT

## 2022-11-02 RX ORDER — ALBUTEROL SULFATE 90 UG/1
2 AEROSOL, METERED RESPIRATORY (INHALATION)
Qty: 18 G | Refills: 3 | Status: SHIPPED | OUTPATIENT
Start: 2022-11-02 | End: 2022-11-02 | Stop reason: SDUPTHER

## 2022-11-02 RX ORDER — POLYETHYLENE GLYCOL 3350 17 G/17G
17 POWDER, FOR SOLUTION ORAL DAILY PRN
Qty: 527 G | Refills: 1 | Status: SHIPPED | OUTPATIENT
Start: 2022-11-02 | End: 2022-11-02 | Stop reason: SDUPTHER

## 2022-11-02 RX ORDER — POTASSIUM CHLORIDE 20 MEQ/1
20 TABLET, EXTENDED RELEASE ORAL 2 TIMES DAILY
Qty: 14 TABLET | Refills: 0 | Status: SHIPPED | OUTPATIENT
Start: 2022-11-02 | End: 2022-11-02 | Stop reason: SDUPTHER

## 2022-11-02 RX ORDER — IPRATROPIUM BROMIDE AND ALBUTEROL SULFATE 2.5; .5 MG/3ML; MG/3ML
1 SOLUTION RESPIRATORY (INHALATION) EVERY 4 HOURS PRN
Qty: 360 ML | Refills: 1 | Status: SHIPPED | OUTPATIENT
Start: 2022-11-02 | End: 2022-11-02 | Stop reason: SDUPTHER

## 2022-11-02 RX ORDER — CEFDINIR 300 MG/1
300 CAPSULE ORAL 2 TIMES DAILY
Qty: 14 CAPSULE | Refills: 0 | Status: SHIPPED | OUTPATIENT
Start: 2022-11-02 | End: 2022-11-02 | Stop reason: SDUPTHER

## 2022-11-02 RX ORDER — AZITHROMYCIN 500 MG/1
500 TABLET, FILM COATED ORAL EVERY 24 HOURS
Qty: 3 TABLET | Refills: 0 | Status: SHIPPED | OUTPATIENT
Start: 2022-11-02 | End: 2022-11-05

## 2022-11-02 RX ORDER — METOPROLOL SUCCINATE 25 MG/1
25 TABLET, EXTENDED RELEASE ORAL DAILY
Qty: 30 TABLET | Refills: 0 | Status: SHIPPED | OUTPATIENT
Start: 2022-11-02 | End: 2022-12-02

## 2022-11-02 RX ORDER — IPRATROPIUM BROMIDE AND ALBUTEROL SULFATE 2.5; .5 MG/3ML; MG/3ML
1 SOLUTION RESPIRATORY (INHALATION) EVERY 4 HOURS PRN
Qty: 360 ML | Refills: 1 | Status: SHIPPED | OUTPATIENT
Start: 2022-11-02 | End: 2022-12-02

## 2022-11-02 RX ORDER — AZITHROMYCIN 500 MG/1
500 TABLET, FILM COATED ORAL EVERY 24 HOURS
Qty: 3 TABLET | Refills: 0 | Status: SHIPPED | OUTPATIENT
Start: 2022-11-02 | End: 2022-11-02 | Stop reason: SDUPTHER

## 2022-11-02 RX ORDER — POTASSIUM CHLORIDE 20 MEQ/1
20 TABLET, EXTENDED RELEASE ORAL 2 TIMES DAILY
Qty: 14 TABLET | Refills: 0 | Status: SHIPPED | OUTPATIENT
Start: 2022-11-02 | End: 2022-11-09

## 2022-11-02 RX ORDER — POTASSIUM CHLORIDE 20 MEQ/1
20 TABLET, EXTENDED RELEASE ORAL ONCE
Status: DISCONTINUED | OUTPATIENT
Start: 2022-11-02 | End: 2022-11-02 | Stop reason: HOSPADM

## 2022-11-02 RX ORDER — ALBUTEROL SULFATE 90 UG/1
2 AEROSOL, METERED RESPIRATORY (INHALATION)
Qty: 18 G | Refills: 3 | Status: SHIPPED | OUTPATIENT
Start: 2022-11-02

## 2022-11-02 RX ORDER — POLYETHYLENE GLYCOL 3350 17 G/17G
17 POWDER, FOR SOLUTION ORAL DAILY PRN
Qty: 527 G | Refills: 1 | Status: SHIPPED | OUTPATIENT
Start: 2022-11-02 | End: 2022-12-02

## 2022-11-02 RX ORDER — IMATINIB MESYLATE 400 MG/1
400 TABLET, FILM COATED ORAL DAILY
Status: DISCONTINUED | OUTPATIENT
Start: 2022-11-02 | End: 2022-11-02 | Stop reason: HOSPADM

## 2022-11-02 RX ORDER — CEFDINIR 300 MG/1
300 CAPSULE ORAL 2 TIMES DAILY
Qty: 12 CAPSULE | Refills: 0 | Status: SHIPPED | OUTPATIENT
Start: 2022-11-02 | End: 2022-11-08

## 2022-11-02 RX ADMIN — OXYCODONE AND ACETAMINOPHEN 1 TABLET: 5; 325 TABLET ORAL at 13:25

## 2022-11-02 RX ADMIN — IMATINIB MESYLATE 400 MG: 400 TABLET, FILM COATED ORAL at 12:26

## 2022-11-02 RX ADMIN — BUSPIRONE HYDROCHLORIDE 20 MG: 5 TABLET ORAL at 08:34

## 2022-11-02 RX ADMIN — ASPIRIN 81 MG: 81 TABLET ORAL at 08:33

## 2022-11-02 RX ADMIN — PAROXETINE HYDROCHLORIDE 30 MG: 20 TABLET, FILM COATED ORAL at 08:33

## 2022-11-02 RX ADMIN — METOPROLOL SUCCINATE 25 MG: 25 TABLET, FILM COATED, EXTENDED RELEASE ORAL at 08:34

## 2022-11-02 RX ADMIN — ALBUTEROL SULFATE 2 PUFF: 90 AEROSOL, METERED RESPIRATORY (INHALATION) at 09:22

## 2022-11-02 RX ADMIN — ALBUTEROL SULFATE 2 PUFF: 90 AEROSOL, METERED RESPIRATORY (INHALATION) at 11:48

## 2022-11-02 RX ADMIN — SODIUM CHLORIDE, PRESERVATIVE FREE 10 ML: 5 INJECTION INTRAVENOUS at 10:30

## 2022-11-02 RX ADMIN — ENOXAPARIN SODIUM 40 MG: 40 INJECTION SUBCUTANEOUS at 08:34

## 2022-11-02 RX ADMIN — POTASSIUM BICARBONATE 40 MEQ: 782 TABLET, EFFERVESCENT ORAL at 10:27

## 2022-11-02 ASSESSMENT — PAIN SCALES - GENERAL
PAINLEVEL_OUTOF10: 5
PAINLEVEL_OUTOF10: 0

## 2022-11-02 ASSESSMENT — PAIN DESCRIPTION - ORIENTATION: ORIENTATION: MID

## 2022-11-02 ASSESSMENT — ENCOUNTER SYMPTOMS: SHORTNESS OF BREATH: 0

## 2022-11-02 ASSESSMENT — PAIN DESCRIPTION - LOCATION: LOCATION: CHEST

## 2022-11-02 ASSESSMENT — PAIN - FUNCTIONAL ASSESSMENT: PAIN_FUNCTIONAL_ASSESSMENT: ACTIVITIES ARE NOT PREVENTED

## 2022-11-02 ASSESSMENT — PAIN DESCRIPTION - DESCRIPTORS: DESCRIPTORS: ACHING

## 2022-11-02 NOTE — CARE COORDINATION
VA benefit pt. With VA ins will not send to [facility] because [facility] may send to any [facility] of their choice. Spoke with Tanmay Vanessa and made aware no referral sent d/t VA benefit. SHe verbalized understanding stated will will f/u with the VA to start the [facility] referral process.

## 2022-11-02 NOTE — PROGRESS NOTES
Nephrology Progress Note  11/2/2022 12:29 PM  Subjective: Interval History: Mary Graham is a 63 y.o. female with  states feels better no acute distress today        Data:   Scheduled Meds:   imatinib  400 mg Oral Daily    sodium chloride flush  5-40 mL IntraVENous 2 times per day    mirtazapine  15 mg Oral Nightly    enoxaparin  40 mg SubCUTAneous Daily    cefTRIAXone (ROCEPHIN) IV  1,000 mg IntraVENous Q24H    And    azithromycin  500 mg Oral Q24H    gabapentin  300 mg Oral Nightly    metoprolol succinate  25 mg Oral Daily    PARoxetine  30 mg Oral Daily    busPIRone  20 mg Oral Daily    aspirin  81 mg Oral Daily    albuterol sulfate HFA  2 puff Inhalation Q4H WA     Continuous Infusions:   sodium chloride           CBC   Recent Labs     10/31/22  1145 11/01/22  0554 11/02/22  0750   WBC 12.0* 9.3 6.2   HGB 13.8 10.9* 10.5*   HCT 38.4 29.8* 30.2*    171 212      BMP   Recent Labs     11/01/22  0554 11/01/22  2141 11/02/22  0750    138  138 138  138   K 2.7* 3.3*  3.2* 3.4*  3.5   CL 99 100  100 104  103   CO2 26 30  30 25  26   PHOS  --  1.9* 2.9   BUN 22 18  18 17  16   CREATININE 1.2* 1.3*  1.3* 1.3*  1.2*     Hepatic:   Recent Labs     10/31/22  1145   AST 13*   ALT 17   BILITOT 0.7   ALKPHOS 92     Troponin: No results for input(s): TROPONINI in the last 72 hours. BNP: No results for input(s): BNP in the last 72 hours. Lipids: No results for input(s): CHOL, HDL in the last 72 hours. Invalid input(s): LDLCALCU  ABGs: No results found for: PHART, PO2ART, WSD0ERZ  INR: No results for input(s): INR in the last 72 hours.   Renal Labs  Albumin:    Lab Results   Component Value Date/Time    LABALBU 3.2 11/02/2022 07:50 AM     Calcium:    Lab Results   Component Value Date/Time    CALCIUM 8.2 11/02/2022 07:50 AM    CALCIUM 8.1 11/02/2022 07:50 AM     Phosphorus:    Lab Results   Component Value Date/Time    PHOS 2.9 11/02/2022 07:50 AM     U/A:    Lab Results   Component Value Date/Time    NITRU NEGATIVE 11/01/2022 09:00 AM    COLORU YELLOW 11/01/2022 09:00 AM    WBCUA <1 01/17/2014 04:50 PM    RBCUA <1 01/17/2014 04:50 PM    MUCUS RARE 01/17/2014 04:50 PM    BACTERIA NEGATIVE 01/17/2014 04:50 PM    CLARITYU CLEAR 11/01/2022 09:00 AM    SPECGRAV <1.005 11/01/2022 09:00 AM    UROBILINOGEN 0.2 11/01/2022 09:00 AM    BILIRUBINUR NEGATIVE 11/01/2022 09:00 AM    BLOODU NEGATIVE 11/01/2022 09:00 AM    KETUA NEGATIVE 11/01/2022 09:00 AM     ABG:  No results found for: PHART, LXW0IVN, PO2ART, HBN6BPS, BEART, THGBART, ZAH7IFE, W3VWTJSN  HgBA1c:  No results found for: LABA1C  Microalbumen/Creatinine ratio:  No components found for: RUCREAT  TSH:  No results found for: TSH  IRON:  No results found for: IRON  Iron Saturation:  No components found for: PERCENTFE  TIBC:  No results found for: TIBC  FERRITIN:  No results found for: FERRITIN  RPR:  No results found for: RPR  DEBBI:  No results found for: ANATITER, DEBBI  24 Hour Urine for Creatinine Clearance:  No components found for: CREAT4, UHRS10, UTV10      Objective:   I/O: 11/01 0701 - 11/02 0700  In: -   Out: 800 [Urine:800]  I/O last 3 completed shifts: In: 120 [P.O.:120]  Out: 800 [Urine:800]  No intake/output data recorded. Vitals: BP 90/65   Pulse 76   Temp 98.3 °F (36.8 °C) (Oral)   Resp 18   Ht 5' 6\" (1.676 m)   Wt 197 lb (89.4 kg)   SpO2 97%   BMI 31.80 kg/m²  {  General appearance: awake weak  HEENT: Head: Normal, normocephalic, atraumatic.   Neck: supple, symmetrical, trachea midline  Lungs: diminished breath sounds bilaterally  Heart: S1, S2 normal  Abdomen: abnormal findings:  soft nt  Extremities: edema trace  Neurologic: Mental status: alertness: alert        Assessment and Plan:      IMP:  #1 shortness of breath with positive influenza  #2 history of TAVR  #3 CML on Gleevec  #4 anxiety/depression  #5 hypokalemia  #6 hyponatremia  #7 acute renal failure from volume depletion    Plan      #1 treated for influenza and above setting we will monitor   #2 cardiac status supportive care   #3 follow-up oncology outpatient with treatment plan   #4 anxiety appears somewhat better today   #5 replete potassium as needed give extra K-Dur 20 meq today  6 renal stable 1.2 monitor   Overall improved             Aurora Chacon MD, MD

## 2022-11-02 NOTE — PLAN OF CARE
Problem: Discharge Planning  Goal: Discharge to home or other facility with appropriate resources  11/2/2022 1032 by Dylan Caicedo LPN  Outcome: Progressing  11/2/2022 0109 by Estuardo Pond LPN  Outcome: Progressing

## 2022-11-02 NOTE — PROGRESS NOTES
Outpatient Pharmacy Progress Note for Meds-to-Beds    The Outpatient Pharmacy received prescriptions for patient. The patient, however, is locked into the [address]. Nurse informed to provide patient paper Rx's to take to the [place] as the [place] does NOT accept transfer prescriptions from outside pharmacies. Thank you for letting us serve your patients.   [facility]    [address], [address] [facility]    Phone: [phone]    Fax: [phone]

## 2022-11-02 NOTE — DISCHARGE SUMMARY
V2.0  Discharge Summary    Name:  Najma Anderson /Age/Sex: 1960 (63 y.o. female)   Admit Date: 10/31/2022  Discharge Date: 22    MRN & CSN:  [id number] & [id number] Encounter Date and Time 22 2:29 PM EDT    Attending:  Romayne Sames, * Discharging Provider: Romayne Sames, MD       Hospital Course:     Brief HPI: Najma Anderson is a 63 y.o. female who presented with SOB    Brief Problem Based Course: 1. Bilateral community acquired pneumonia - SIRS +Ve. Positive for influenza. (Symptoms started 6 Days back) CXR with infiltrates. Not requiring oxygen. Ceftriaxone and Azithromycin. Blood cultures, sputum cuture, legionella antigen, strep pneumonia antigen negative. Duoneb. Improved with treatment. Advised to f/u with Pulmonology. Discharged on oral ABx     2. Hypokalemia likely 2/2 intractable N/V and poor oral intake. Supplemented. Will continue on Discharge. Advised to f/u with PCP to recheck BMP for continuation     3. Acute kidney injury, in the setting of N/V/decreased oral intake - S/P IV fluids. BMP daily. Since able to take orally, encouraged Oral hydration     4. Detectable troponin, prolonged Qtc likely from ALVARO. No chest pain. EKG with Qtc of 647. Troponin down trending. 5. Hx of TAVR in 2017 at [facility]. Continue ASA, metoprolol. Lasix was held. Advised to f/u with cardiology in a week to assess if lasix need to be resumed     6. History of radial artery thrombus     7. Chronic myeloid leukemia. Continue Gleevec     Other Co morbidities      History of ovarian cancer   Essential hypertension - Continue Home meds  Paroxysmal atrial fibrillation - Continue home metoprolol  Thoracic back pain - Continue home gabapentin       The patient expressed appropriate understanding of, and agreement with the discharge recommendations, medications, and plan.      Consults this admission:  IP CONSULT TO HOSPITALIST  IP CONSULT TO NEPHROLOGY  IP CONSULT TO CARDIOLOGY  IP CONSULT TO HOME CARE NEEDS    Discharge Diagnosis:   Community acquired pneumonia, bilateral      Discharge Instruction:   Follow up appointments: nephrology, cardiology, pulmonology, PCP  Primary care physician: Primitivo Givens within 2 weeks  Diet: cardiac diet   Activity: activity as tolerated  Disposition: Discharged to:   [x]Home, []C, []SNF, []Acute Rehab, []Hospice   Condition on discharge: Stable  Labs and Tests to be Followed up as an outpatient by PCP or Specialist: BMP     Discharge Medications:        Medication List        START taking these medications      albuterol sulfate  (90 Base) MCG/ACT inhaler  Commonly known as: PROVENTIL;VENTOLIN;PROAIR  Inhale 2 puffs into the lungs every 4 hours (while awake)     azithromycin 500 MG tablet  Commonly known as: ZITHROMAX  Take 1 tablet by mouth every 24 hours for 3 days     cefdinir 300 MG capsule  Commonly known as: OMNICEF  Take 1 capsule by mouth 2 times daily for 6 days     ipratropium-albuterol 0.5-2.5 (3) MG/3ML Soln nebulizer solution  Commonly known as: DUONEB  Inhale 3 mLs into the lungs every 4 hours as needed for Shortness of Breath     polyethylene glycol 17 g packet  Commonly known as: GLYCOLAX  Take 17 g by mouth daily as needed for Constipation     potassium chloride 20 MEQ extended release tablet  Commonly known as: KLOR-CON M  Take 1 tablet by mouth 2 times daily for 7 days            CONTINUE taking these medications      aspirin 81 MG EC tablet  Take 1 tablet by mouth daily     BUSPIRONE HCL PO     GABAPENTIN PO     imatinib 400 MG chemo tablet  Commonly known as: GLEEVEC     metoprolol succinate 25 MG extended release tablet  Commonly known as: TOPROL XL  Take 1 tablet by mouth daily     PARoxetine 30 MG tablet  Commonly known as: PAXIL            STOP taking these medications      acetaminophen 325 MG tablet  Commonly known as: TYLENOL     clopidogrel 75 MG tablet  Commonly known as: PLAVIX     furosemide 20 MG tablet  Commonly known as: LASIX     mirtazapine 30 MG tablet  Commonly known as: REMERON     nicotine 21 MG/24HR  Commonly known as: Heidi Brandon     oxyCODONE-acetaminophen  MG per tablet  Commonly known as: PERCOCET     promethazine 25 MG tablet  Commonly known as: PHENERGAN               Where to Get Your Medications        These medications were sent to [address], [address] [facility]      Phone: [phone]   metoprolol succinate 25 MG extended release tablet       You can get these medications from any pharmacy    Bring a paper prescription for each of these medications  albuterol sulfate  (90 Base) MCG/ACT inhaler  azithromycin 500 MG tablet  cefdinir 300 MG capsule  ipratropium-albuterol 0.5-2.5 (3) MG/3ML Soln nebulizer solution  polyethylene glycol 17 g packet  potassium chloride 20 MEQ extended release tablet        Objective Findings at Discharge:   BP 97/60   Pulse 76   Temp 97.3 °F (36.3 °C)   Resp 20   Ht 5' 6\" (1.676 m)   Wt 197 lb (89.4 kg)   SpO2 95%   BMI 31.80 kg/m²       Physical Exam:   General: NAD  Eyes: EOMI  ENT: neck supple  Cardiovascular: Regular rate. Respiratory: Clear to auscultation  Gastrointestinal: Soft, non tender  Genitourinary: no suprapubic tenderness  Musculoskeletal: No edema  Skin: warm, dry  Neuro: Alert. Psych: Mood appropriate. Labs and Imaging   XR CHEST PORTABLE    Result Date: 11/2/2022  EXAMINATION: ONE XRAY VIEW OF THE CHEST 11/2/2022 4:32 am COMPARISON: 10/31/2022 and prior HISTORY: ORDERING SYSTEM PROVIDED HISTORY: SOB TECHNOLOGIST PROVIDED HISTORY: Reason for exam:->SOB Reason for Exam: SOB FINDINGS: Surgical clips and suture material extending from the right hilum to the periphery of the right midlung. Status post TAVR. Improved aeration at the lung bases. .Normal cardiomediastinal silhouette. No pleural effusion or pneumothorax. No acute osseous abnormality. Improved aeration at the lung bases, otherwise no significant change compared with 10/31/2022. XR CHEST PORTABLE    Result Date: 10/31/2022  EXAMINATION: ONE XRAY VIEW OF THE CHEST 10/31/2022 12:15 pm COMPARISON: 10/27/2017 HISTORY: ORDERING SYSTEM PROVIDED HISTORY: SOB cough TECHNOLOGIST PROVIDED HISTORY: Reason for exam:->SOB cough Reason for Exam: sob, cough FINDINGS: There are surgical clips in the right hilar region. The patient is status post TAVR. The heart size and pulmonary vascularity are normal there is no evidence for pulmonary edema. There is bibasilar infiltrate. Surgical staple line in the right mid lung zone. Bibasilar infiltrate Status post TAVR       CBC:   Recent Labs     10/31/22  1145 11/01/22  0554 11/02/22  0750   WBC 12.0* 9.3 6.2   HGB 13.8 10.9* 10.5*    171 212     BMP:    Recent Labs     11/01/22  0554 11/01/22  2141 11/02/22  0750    138  138 138  138   K 2.7* 3.3*  3.2* 3.4*  3.5   CL 99 100  100 104  103   CO2 26 30  30 25  26   BUN 22 18  18 17  16   CREATININE 1.2* 1.3*  1.3* 1.3*  1.2*   GLUCOSE 112* 115*  111* 108*  107*     Hepatic:   Recent Labs     10/31/22  1145   AST 13*   ALT 17   BILITOT 0.7   ALKPHOS 92     Lipids: No results found for: CHOL, HDL, TRIG  Hemoglobin A1C: No results found for: LABA1C  TSH: No results found for: TSH  Troponin:   Lab Results   Component Value Date/Time    TROPONINT 0.031 11/01/2022 12:15 PM    TROPONINT 0.041 11/01/2022 05:54 AM    TROPONINT 0.052 11/01/2022 01:35 AM     Lactic Acid: No results for input(s): LACTA in the last 72 hours.   BNP:   Recent Labs     10/31/22  1145   PROBNP 1,199*     UA:  Lab Results   Component Value Date/Time    NITRU NEGATIVE 11/01/2022 09:00 AM    COLORU YELLOW 11/01/2022 09:00 AM    WBCUA <1 01/17/2014 04:50 PM    RBCUA <1 01/17/2014 04:50 PM    MUCUS RARE 01/17/2014 04:50 PM    BACTERIA NEGATIVE 01/17/2014 04:50 PM    CLARITYU CLEAR 11/01/2022 09:00 AM    SPECGRAV <1.005 11/01/2022 09:00 AM    LEUKOCYTESUR NEGATIVE 11/01/2022 09:00 AM    UROBILINOGEN 0.2 11/01/2022 09:00 AM    BILIRUBINUR NEGATIVE 11/01/2022 09:00 AM    BLOODU NEGATIVE 11/01/2022 09:00 AM    KETUA NEGATIVE 11/01/2022 09:00 AM     Urine Cultures: No results found for: LABURIN  Blood Cultures: No results found for: BC  No results found for: BLOODCULT2  Organism: No results found for: ORG    Time Spent Discharging patient 35 minutes    Electronically signed by Viet Noel MD on 11/2/2022 at 2:29 PM

## 2022-11-02 NOTE — CARE COORDINATION
Pt on discharge with [facility], [facility]. ordered. PS to Won Rodriguez with [facility].       [address] [place] [address] can not take pt d/t payment source if [facility]. Completed Home care request for [facility] and faxed to [facility] [phone] and [address] primary care clinic at [phone]. Also gave pt order for Nebulizer. She states has appt at clinic for Tuesday. Denies any other needs at this time.

## 2022-11-02 NOTE — PROGRESS NOTES
Cardiology Progress Note     Today's Plan: [facility]    Admit Date:  10/31/2022    Consult reason/ Seen today for: SOB    Subjective and  Overnight Events:  Denies any chest pain, SOB, or palpations. Assessment / Plan / Recommendation:     VHD: S/P TAVR echo shows EF 55-60%, normal functioning valve mild-moderate mitral regurgitation. Hypokalemia: k 2.7 recommend replacement. K 3.5 today. Pneumonia: +influenza  Prolonged QTc:?secondary to ALVARO and elevated K. Patient already discharged when not is being placed. Recommend F/U with cardiology and EKG. Will notify office to schedule appt. History of Presenting Illness:    Chief complain on admission : 63 y. o.year old who is admitted for  Chief Complaint   Patient presents with    Emesis     Vomiting since last tuesday        Past medical history:    has a past medical history of Aortic valve disease, CML (chronic myelocytic leukemia) ([facility] [address] H/O cardiac catheterization, H/O echocardiogram, Leukemia ([facility] [address] Leukemia ([facility] [address] Lung mass, and Ovarian cancer ([facility] [address] Past surgical history:   has a past surgical history that includes Hysterectomy; bone marrow biopsy (feb 2012); Cholecystectomy; Dental surgery; thoracotomy; and Aortic valve replacement (09/25/2017). Social History:   reports that she has quit smoking. She has a 19.00 pack-year smoking history. She has never used smokeless tobacco. She reports current drug use. Drug: Marijuana Britton Doll). She reports that she does not drink alcohol. Family history:  family history includes Cancer in her mother; Heart Disease in her maternal grandmother; Kidney Disease in her mother. Allergies   Allergen Reactions    Ampicillin Other (See Comments)     Vein start to burn and swelling.  Entire arm     Bee Venom Anaphylaxis    Penicillins Swelling and Rash    Typhoid Vaccines Other (See Comments)     Pt cannot remember     Codeine Nausea And Vomiting    Morphine Nausea And Vomiting Tape [place] Tape] Rash    Vicodin [Hydrocodone-Acetaminophen] Nausea And Vomiting       Review of Systems:  Review of Systems   Respiratory:  Negative for shortness of breath. Cardiovascular:  Negative for chest pain, palpitations and leg swelling. Musculoskeletal: Negative. Skin: Negative. Neurological:  Negative for dizziness and weakness. All other systems reviewed and are negative. BP 97/60   Pulse 76   Temp 97.3 °F (36.3 °C)   Resp 20   Ht 5' 6\" (1.676 m)   Wt 197 lb (89.4 kg)   SpO2 95%   BMI 31.80 kg/m²   No intake or output data in the 24 hours ending 11/02/22 1631    Physical Exam:  Physical Exam  Constitutional:       Appearance: She is well-developed. Cardiovascular:      Rate and Rhythm: Normal rate and regular rhythm. Pulses: Intact distal pulses. Dorsalis pedis pulses are 2+ on the right side and 2+ on the left side. Posterior tibial pulses are 2+ on the right side and 2+ on the left side. Heart sounds: Normal heart sounds, S1 normal and S2 normal.   Pulmonary:      Effort: Pulmonary effort is normal.      Breath sounds: Normal breath sounds. Musculoskeletal:         General: Normal range of motion. Skin:     General: Skin is warm and dry. Neurological:      Mental Status: She is alert and oriented to person, place, and time.         Medications:    imatinib  400 mg Oral Daily    potassium chloride  20 mEq Oral Once    sodium chloride flush  5-40 mL IntraVENous 2 times per day    mirtazapine  15 mg Oral Nightly    enoxaparin  40 mg SubCUTAneous Daily    cefTRIAXone (ROCEPHIN) IV  1,000 mg IntraVENous Q24H    And    azithromycin  500 mg Oral Q24H    gabapentin  300 mg Oral Nightly    metoprolol succinate  25 mg Oral Daily    PARoxetine  30 mg Oral Daily    busPIRone  20 mg Oral Daily    aspirin  81 mg Oral Daily    albuterol sulfate HFA  2 puff Inhalation Q4H WA      sodium chloride Stopped (11/02/22 1423)     sodium chloride flush, sodium chloride, albuterol, polyethylene glycol, senna, acetaminophen **OR** acetaminophen, ipratropium-albuterol, prochlorperazine **OR** prochlorperazine, potassium chloride **OR** potassium alternative oral replacement **OR** potassium chloride, oxyCODONE-acetaminophen **AND** oxyCODONE    Lab Data:  CBC:   Recent Labs     10/31/22  1145 22  0554 22  0750   WBC 12.0* 9.3 6.2   HGB 13.8 10.9* 10.5*   HCT 38.4 29.8* 30.2*   MCV 87.3 85.9 89.6    171 212     BMP:   Recent Labs     22  0554 22  2141 22  0750    138  138 138  138   K 2.7* 3.3*  3.2* 3.4*  3.5   CL 99 100  100 104  103   CO2 26 30  30 25  26   PHOS  --  1.9* 2.9   BUN 22 18  18 17  16   CREATININE 1.2* 1.3*  1.3* 1.3*  1.2*     PT/INR: No results for input(s): PROTIME, INR in the last 72 hours. BNP:    Recent Labs     10/31/22  1145   PROBNP 1,199*     TROPONIN:   Recent Labs     22  0135 22  0554 22  1215   TROPONINT 0.052* 0.041* 0.031*        Impression:  Principal Problem:    Community acquired pneumonia, bilateral  Resolved Problems:    * No resolved hospital problems. *             All labs, medications and tests reviewed by myself, continue all other medications of all above medical condition listed as is except for changes mentioned above. Thank you   Please call with questions. Electronically signed by Blu Gay. SANDER Dong CNP on 2022 at 4:31 PM     I have seen ,spoken to  and examined this patient personally, independently of the PAMELA. I have reviewed the hospital care given to date and reviewed all pertinent labs and imaging. I have spoken with patient, nursing staff and provided written and verbal instructions . The above note has been reviewed     CARDIOLOGY ATTENDING ADDENDUM    HPI:  I have reviewed the above HPI  And agree with above     Pulse Range: Pulse  Av.8  Min: 73  Max: 76    Blood Presuure Range:  Systolic (84FRB), XYV:374 , Min:90 , Max:110   ; Diastolic (48YNU), RZI:95, Min:53, Max:65      Pulse ox Range: SpO2  Av.4 %  Min: 91 %  Max: 97 %    24hr I & O:  No intake or output data in the 24 hours ending 22 1719      BP 97/60   Pulse 76   Temp 97.3 °F (36.3 °C)   Resp 20   Ht 5' 6\" (1.676 m)   Wt 197 lb (89.4 kg)   SpO2 95%   BMI 31.80 kg/m²       Physical Exam:  General:  Awake, alert, NAD  Head:normal  Eye:normal  Neck:  No JVD   Chest:  Clear to auscultation, respiration easy  Cardiovascular:  RRR S1S2  Abdomen:   nontender  Extremities:  tr edema  Pulses; palpable  Neuro: grossly normal      MEDICAL DECISION MAKING;    Pt assessed , chart reviewed, patient examined examined , all available data was reviewed, following is the plan which was discussed with PAMELA as well:    -Status post TAVR patient is stable had an echocardiogram done not too long ago which was unremarkable it was back in May we will get a limited echo  -BNP is elevated however the creatinine is mildly elevated as well  -Hypokalemia potassium of 2.7 needs corrected  -Anemia hemoglobin of 10.9 can cause high-output failure   Conclusions      Summary   This is a limited echo. Left ventricular systolic function is normal.   Ejection fraction is visually estimated at 55-60%. S/p TAVR (26 mm Medtronic Evolut PRO). No significant PVL. Mean P mmHg,   EDGARDO: 1.50 cm sq. Elevated gradients likely due to hemodynamic state. Mild-moderate mitral regurgitation. No evidence of any pericardial effusion.       Signature      ------------------------------------------------------------------   Electronically signed by Armida Salcido MD   (Interpreting physician) on 2022 at 02:36 PM   ------------------------------------------------------------------          Max Coates MD [facility]

## 2022-11-05 LAB
CULTURE: NORMAL
CULTURE: NORMAL
Lab: NORMAL
Lab: NORMAL
SPECIMEN: NORMAL
SPECIMEN: NORMAL

## 2022-11-08 ENCOUNTER — HOSPITAL ENCOUNTER (EMERGENCY)
Age: 62
Discharge: HOME | End: 2022-11-08
Attending: EMERGENCY MEDICINE
Payer: OTHER GOVERNMENT

## 2022-11-08 ENCOUNTER — APPOINTMENT (OUTPATIENT)
Dept: GENERAL RADIOLOGY | Age: 62
End: 2022-11-08
Payer: OTHER GOVERNMENT

## 2022-11-08 ENCOUNTER — TELEPHONE (OUTPATIENT)
Dept: CARDIOLOGY CLINIC | Age: 62
End: 2022-11-08

## 2022-11-08 VITALS
HEIGHT: 66 IN | TEMPERATURE: 97.7 F | HEART RATE: 87 BPM | WEIGHT: 193 LBS | DIASTOLIC BLOOD PRESSURE: 67 MMHG | OXYGEN SATURATION: 99 % | BODY MASS INDEX: 31.02 KG/M2 | RESPIRATION RATE: 20 BRPM | SYSTOLIC BLOOD PRESSURE: 140 MMHG

## 2022-11-08 DIAGNOSIS — R53.83 MALAISE AND FATIGUE: Primary | ICD-10-CM

## 2022-11-08 DIAGNOSIS — R06.4 ACUTE HYPERVENTILATION: ICD-10-CM

## 2022-11-08 DIAGNOSIS — R53.81 MALAISE AND FATIGUE: Primary | ICD-10-CM

## 2022-11-08 LAB
ALBUMIN SERPL-MCNC: 3.4 GM/DL (ref 3.4–5)
ALP BLD-CCNC: 79 IU/L (ref 40–129)
ALT SERPL-CCNC: 10 U/L (ref 10–40)
ANION GAP SERPL CALCULATED.3IONS-SCNC: 9 MMOL/L (ref 4–16)
AST SERPL-CCNC: 13 IU/L (ref 15–37)
BASE EXCESS MIXED: 1.5 (ref 0–2.3)
BASOPHILS ABSOLUTE: 0 K/CU MM
BASOPHILS RELATIVE PERCENT: 0.7 % (ref 0–1)
BILIRUB SERPL-MCNC: 0.4 MG/DL (ref 0–1)
BILIRUBIN URINE: NEGATIVE MG/DL
BLOOD, URINE: NEGATIVE
BUN BLDV-MCNC: 7 MG/DL (ref 6–23)
CALCIUM SERPL-MCNC: 8.6 MG/DL (ref 8.3–10.6)
CARBON MONOXIDE, BLOOD: 2 % (ref 0–5)
CHLORIDE BLD-SCNC: 102 MMOL/L (ref 99–110)
CLARITY: CLEAR
CO2 CONTENT: 22.7 MMOL/L (ref 19–24)
CO2: 26 MMOL/L (ref 21–32)
COLOR: YELLOW
COMMENT: ABNORMAL
CREAT SERPL-MCNC: 1.1 MG/DL (ref 0.6–1.1)
DIFFERENTIAL TYPE: ABNORMAL
EKG ATRIAL RATE: 77 BPM
EKG DIAGNOSIS: NORMAL
EKG P AXIS: -14 DEGREES
EKG P-R INTERVAL: 120 MS
EKG Q-T INTERVAL: 412 MS
EKG QRS DURATION: 78 MS
EKG QTC CALCULATION (BAZETT): 466 MS
EKG R AXIS: 27 DEGREES
EKG T AXIS: 50 DEGREES
EKG VENTRICULAR RATE: 77 BPM
EOSINOPHILS ABSOLUTE: 0.2 K/CU MM
EOSINOPHILS RELATIVE PERCENT: 3.3 % (ref 0–3)
GFR SERPL CREATININE-BSD FRML MDRD: 58 ML/MIN/1.73M2
GLUCOSE BLD-MCNC: 101 MG/DL (ref 70–99)
GLUCOSE, URINE: NEGATIVE MG/DL
HCO3 ARTERIAL: 22 MMOL/L (ref 18–23)
HCT VFR BLD CALC: 33.4 % (ref 37–47)
HEMOGLOBIN: 11.2 GM/DL (ref 12.5–16)
IMMATURE NEUTROPHIL %: 0.3 % (ref 0–0.43)
KETONES, URINE: NEGATIVE MG/DL
LEUKOCYTE ESTERASE, URINE: NEGATIVE
LYMPHOCYTES ABSOLUTE: 1.5 K/CU MM
LYMPHOCYTES RELATIVE PERCENT: 25 % (ref 24–44)
MAGNESIUM: 1.6 MG/DL (ref 1.8–2.4)
MCH RBC QN AUTO: 30.9 PG (ref 27–31)
MCHC RBC AUTO-ENTMCNC: 33.5 % (ref 32–36)
MCV RBC AUTO: 92.3 FL (ref 78–100)
METHEMOGLOBIN ARTERIAL: 1.2 %
MONOCYTES ABSOLUTE: 0.4 K/CU MM
MONOCYTES RELATIVE PERCENT: 6.3 % (ref 0–4)
NITRITE URINE, QUANTITATIVE: NEGATIVE
NUCLEATED RBC %: 0 %
O2 SATURATION: 94.4 % (ref 96–97)
PCO2 ARTERIAL: 24 MMHG (ref 32–45)
PDW BLD-RTO: 13.9 % (ref 11.7–14.9)
PH BLOOD: 7.57 (ref 7.34–7.45)
PH, URINE: 6 (ref 5–8)
PLATELET # BLD: 256 K/CU MM (ref 140–440)
PMV BLD AUTO: 10.1 FL (ref 7.5–11.1)
PO2 ARTERIAL: 68 MMHG (ref 75–100)
POTASSIUM SERPL-SCNC: 3.5 MMOL/L (ref 3.5–5.1)
PROTEIN UA: NEGATIVE MG/DL
RBC # BLD: 3.62 M/CU MM (ref 4.2–5.4)
SEGMENTED NEUTROPHILS ABSOLUTE COUNT: 3.9 K/CU MM
SEGMENTED NEUTROPHILS RELATIVE PERCENT: 64.4 % (ref 36–66)
SODIUM BLD-SCNC: 137 MMOL/L (ref 135–145)
SPECIFIC GRAVITY UA: 1.02 (ref 1–1.03)
TOTAL IMMATURE NEUTOROPHIL: 0.02 K/CU MM
TOTAL NUCLEATED RBC: 0 K/CU MM
TOTAL PROTEIN: 6.7 GM/DL (ref 6.4–8.2)
TROPONIN T: <0.01 NG/ML
UROBILINOGEN, URINE: 0.2 MG/DL (ref 0.2–1)
WBC # BLD: 6 K/CU MM (ref 4–10.5)

## 2022-11-08 PROCEDURE — 81003 URINALYSIS AUTO W/O SCOPE: CPT

## 2022-11-08 PROCEDURE — 83735 ASSAY OF MAGNESIUM: CPT

## 2022-11-08 PROCEDURE — 80053 COMPREHEN METABOLIC PANEL: CPT

## 2022-11-08 PROCEDURE — 93010 ELECTROCARDIOGRAM REPORT: CPT | Performed by: INTERNAL MEDICINE

## 2022-11-08 PROCEDURE — 82803 BLOOD GASES ANY COMBINATION: CPT

## 2022-11-08 PROCEDURE — 87040 BLOOD CULTURE FOR BACTERIA: CPT

## 2022-11-08 PROCEDURE — 99285 EMERGENCY DEPT VISIT HI MDM: CPT

## 2022-11-08 PROCEDURE — 71046 X-RAY EXAM CHEST 2 VIEWS: CPT

## 2022-11-08 PROCEDURE — 85025 COMPLETE CBC W/AUTO DIFF WBC: CPT

## 2022-11-08 PROCEDURE — 36600 WITHDRAWAL OF ARTERIAL BLOOD: CPT

## 2022-11-08 PROCEDURE — 84484 ASSAY OF TROPONIN QUANT: CPT

## 2022-11-08 PROCEDURE — 93005 ELECTROCARDIOGRAM TRACING: CPT | Performed by: EMERGENCY MEDICINE

## 2022-11-08 RX ORDER — MIRTAZAPINE 30 MG/1
30 TABLET, FILM COATED ORAL NIGHTLY
COMMUNITY
Start: 2022-09-14

## 2022-11-08 ASSESSMENT — LIFESTYLE VARIABLES
HOW OFTEN DO YOU HAVE A DRINK CONTAINING ALCOHOL: NEVER
HOW MANY STANDARD DRINKS CONTAINING ALCOHOL DO YOU HAVE ON A TYPICAL DAY: PATIENT DOES NOT DRINK

## 2022-11-08 NOTE — ED NOTES
Medication History  [facility]    Patient Name: Carey Olivares 1960     Medication history has been completed by: Bora Andrade CPhT    Source(s) of information: patient insurance claims, [facility] progress notes     Primary Care Physician: Dean Mcgill: [facility]    Allergies as of 11/08/2022 - Fully Reviewed 11/08/2022   Allergen Reaction Noted    Ampicillin Other (See Comments) 05/25/2012    Bee venom Anaphylaxis 05/25/2012    Penicillins Swelling and Rash 02/14/2016    Typhoid vaccines Other (See Comments) 02/14/2016    Codeine Nausea And Vomiting 05/25/2012    Morphine Nausea And Vomiting 01/17/2014    Tape [adhesive tape] Rash 02/14/2016    Vicodin [hydrocodone-acetaminophen] Nausea And Vomiting 02/14/2016        Prior to Admission medications    Medication Sig Start Date End Date Taking?  Authorizing Provider   mirtazapine (REMERON) 30 MG tablet Take 30 mg by mouth nightly 9/14/22  Yes Historical Provider, MD   metoprolol succinate (TOPROL XL) 25 MG extended release tablet Take 1 tablet by mouth daily 11/2/22 12/2/22  Evelyn Ruiz MD   potassium chloride (KLOR-CON M) 20 MEQ extended release tablet Take 1 tablet by mouth 2 times daily for 7 days 11/2/22 11/9/22  Evelyn Ruiz MD   albuterol sulfate HFA (PROVENTIL;VENTOLIN;PROAIR) 108 (90 Base) MCG/ACT inhaler Inhale 2 puffs into the lungs every 4 hours (while awake) 11/2/22   Evelyn Ruiz MD   polyethylene glycol (GLYCOLAX) 17 g packet Take 17 g by mouth daily as needed for Constipation 11/2/22 12/2/22  Evelyn Ruiz MD   cefdinir (OMNICEF) 300 MG capsule Take 1 capsule by mouth 2 times daily for 6 days 11/2/22 11/8/22  Evelyn Ruiz MD   ipratropium-albuterol (DUONEB) 0.5-2.5 (3) MG/3ML SOLN nebulizer solution Inhale 3 mLs into the lungs every 4 hours as needed for Shortness of Breath 11/2/22 12/2/22  Evelyn Ruiz MD   BUSPIRONE HCL PO Take 20 mg by

## 2022-11-08 NOTE — ED NOTES
Pt walked to the bathroom and back, no distress noted at this time. Pt tolerated well.      Yvon Morocho RN  11/08/22 5786

## 2022-11-08 NOTE — ED PROVIDER NOTES
[address] ENCOUNTER      Pt Name: Kristin Carrillo  MRN: [id number]  [place] 1960  Date of evaluation: 11/8/2022  Provider: Katlin Brown MD    CHIEF COMPLAINT     No chief complaint on file. HISTORY OF PRESENT ILLNESS    HPI    Nursing Notes were reviewed. Kristin Carrillo is a 63 y.o. female who presents to the emergency department with malaise and increasing shortness of breath. Patient was recently discharged on October 31 from the hospital after diagnosis of pneumonia. Discharge diagnoses at that time;  Bilateral community-acquired pneumonia  Hypokalemia  ALVARO  Evaded troponin  Chronic mild leukemia continuing on Gleevec    Patient says that she did attempt to follow-up with outpatient providers after being discharged and today she was told that she was getting \"worse \"and to come back to the emergency department. Patient describes worsening malaise, severe weakness, shortness of breath, and feeling as if she is unable to take a deep breath. She denies substernal chest pain. She describes mild nausea but no vomiting. She denies recent fevers. She denies new cough. The patient does describe some difficulty urinating but no dysuria or frequency. Her last urination occurred almost 24 hours ago. She denies hematochezia or hematemesis    REVIEW OF SYSTEMS       Review of Systems    10 Systems were reviewed with this patient and all were negative with exception of those noted in the history of present illness above.       PAST MEDICAL HISTORY     Past Medical History:   Diagnosis Date    Aortic valve disease     CML (chronic myelocytic leukemia) ([address]     H/O cardiac catheterization 08/04/2017    normal arteries, severe AS    H/O echocardiogram 05/2018    EF50-55% prostetic AV, mild Mr, trace-mild TR    Leukemia ([address]     Leukemia ([address]     Lung mass     Ovarian cancer ([address]          SURGICAL HISTORY       Past Surgical History:   Procedure Laterality Date    AORTIC VALVE REPLACEMENT  09/25/2017    TAVR-[place]    BONE MARROW BIOPSY  feb 2012    CHOLECYSTECTOMY      DENTAL SURGERY      HYSTERECTOMY (CERVIX STATUS UNKNOWN)      THORACOTOMY           CURRENT MEDICATIONS       Discharge Medication List as of 11/8/2022  4:49 PM        CONTINUE these medications which have NOT CHANGED    Details   mirtazapine (REMERON) 30 MG tablet Take 30 mg by mouth nightlyHistorical Med      metoprolol succinate (TOPROL XL) 25 MG extended release tablet Take 1 tablet by mouth daily, Disp-30 tablet, R-0Normal      potassium chloride (KLOR-CON M) 20 MEQ extended release tablet Take 1 tablet by mouth 2 times daily for 7 days, Disp-14 tablet, R-0Print      albuterol sulfate HFA (PROVENTIL;VENTOLIN;PROAIR) 108 (90 Base) MCG/ACT inhaler Inhale 2 puffs into the lungs every 4 hours (while awake), Disp-18 g, R-3Print      polyethylene glycol (GLYCOLAX) 17 g packet Take 17 g by mouth daily as needed for Constipation, Disp-527 g, R-1Print      cefdinir (OMNICEF) 300 MG capsule Take 1 capsule by mouth 2 times daily for 6 days, Disp-12 capsule, R-0Print      ipratropium-albuterol (DUONEB) 0.5-2.5 (3) MG/3ML SOLN nebulizer solution Inhale 3 mLs into the lungs every 4 hours as needed for Shortness of Breath, Disp-360 mL, R-1Print      busPIRone (BUSPAR) 10 MG tablet Take 20 mg by mouth dailyHistorical Med      PARoxetine (PAXIL) 30 MG tablet Take 30 mg by mouth every morningHistorical Med      aspirin 81 MG EC tablet Take 1 tablet by mouth daily, Disp-90 tablet, R-3Print      gabapentin (NEURONTIN) 400 MG capsule Take 400 mg by mouth 3 times dailyHistorical Med      imatinib (GLEEVEC) 400 MG tablet Take 400 mg by mouth daily.              ALLERGIES     Ampicillin, Bee venom, Penicillins, Typhoid vaccines, Codeine, Morphine, Tape [adhesive tape], and Vicodin [hydrocodone-acetaminophen]    FAMILY HISTORY       Family History   Problem Relation Age of Onset Cancer Mother     Kidney Disease Mother     Heart Disease Maternal Grandmother           SOCIAL HISTORY       Social History     Socioeconomic History    Marital status:      Spouse name: None    Number of children: None    Years of education: None    Highest education level: None   Tobacco Use    Smoking status: Former     Packs/day: 0.50     Years: 38.00     Pack years: 19.00     Types: Cigarettes    Smokeless tobacco: Never   Vaping Use    Vaping Use: Every day    Substances: Nicotine    Passive vaping exposure: Yes   Substance and Sexual Activity    Alcohol use: No    Drug use: Yes     Types: Marijuana (Weed)    Sexual activity: Never       SCREENINGS         Mary Coma Scale  Eye Opening: Spontaneous  Best Verbal Response: Oriented  Best Motor Response: Obeys commands  [place] Coma Scale Score: 15                     CIWA Assessment  BP: (!) 140/67  Heart Rate: 87                 PHYSICAL EXAM       ED Triage Vitals   BP Temp Temp Source Heart Rate Resp SpO2 Height Weight   11/08/22 1156 11/08/22 1229 11/08/22 1229 11/08/22 1156 11/08/22 1156 11/08/22 1156 11/08/22 1156 11/08/22 1156   (!) 158/76 97.4 °F (36.3 °C) Oral 90 18 100 % 5' 6\" (1.676 m) 193 lb (87.5 kg)       Physical Exam  Vitals and nursing note reviewed. Constitutional:       General: She is in acute distress. Appearance: Normal appearance. She is obese. She is ill-appearing. She is not toxic-appearing or diaphoretic. HENT:      Head: Normocephalic and atraumatic. Nose: Nose normal.      Mouth/Throat:      Mouth: Mucous membranes are dry. Eyes:      Extraocular Movements: Extraocular movements intact. Cardiovascular:      Rate and Rhythm: Normal rate and regular rhythm. Pulses: Normal pulses. Heart sounds: Normal heart sounds. Pulmonary:      Effort: Respiratory distress present. Breath sounds: No stridor. No wheezing or rhonchi. Abdominal:      Palpations: Abdomen is soft. Tenderness:  There is abdominal tenderness in the right upper quadrant. Musculoskeletal:         General: Normal range of motion. Cervical back: Normal range of motion. Skin:     General: Skin is warm and dry. Capillary Refill: Capillary refill takes 2 to 3 seconds. Coloration: Skin is pale. Neurological:      General: No focal deficit present. Mental Status: She is alert and oriented to person, place, and time. Psychiatric:         Mood and Affect: Mood normal.         Behavior: Behavior normal.       DIAGNOSTIC RESULTS     EKG: All EKG's are interpreted by the Emergency Department Physician who either signs or Co-signs this chart in the absence of a cardiologist.    Normal sinus rhythm. Ventricular rate of 77. DC is 120. QRS is 78. QTc is 466. There are no abnormal ST elevations or depressions. There is no ectopy. PVCs noted on a prior EKG from October 31 but no longer present. RADIOLOGY:   Non-plain film images such as CT, Ultrasound and MRI are read by the radiologist. Plain radiographic images are visualized and preliminarily interpreted by the emergency physician with the below findings:    Interpretation per the Radiologist below, if available at the time of this note:    XR CHEST (2 VW)   Final Result   1. Left basilar atelectasis, otherwise no acute cardiopulmonary process   identified.              LABS:  Labs Reviewed   CBC WITH AUTO DIFFERENTIAL - Abnormal; Notable for the following components:       Result Value    RBC 3.62 (*)     Hemoglobin 11.2 (*)     Hematocrit 33.4 (*)     Monocytes % 6.3 (*)     Eosinophils % 3.3 (*)     All other components within normal limits   COMPREHENSIVE METABOLIC PANEL - Abnormal; Notable for the following components:    Est, Glom Filt Rate 58 (*)     Glucose 101 (*)     AST 13 (*)     All other components within normal limits   MAGNESIUM - Abnormal; Notable for the following components:    Magnesium 1.6 (*)     All other components within normal limits BLOOD GAS, ARTERIAL - Abnormal; Notable for the following components:    pH, Bld 7.57 (*)     pCO2, Arterial 24.0 (*)     pO2, Arterial 68 (*)     O2 Sat 94.4 (*)     All other components within normal limits   CULTURE, BLOOD 1   CULTURE, BLOOD 1   TROPONIN   URINALYSIS       All other labs were within normal range or not returned as of this dictation. EMERGENCY DEPARTMENT COURSE and DIFFERENTIAL DIAGNOSIS/MDM:   Vitals:    Vitals:    11/08/22 1220 11/08/22 1229 11/08/22 1241 11/08/22 1654   BP:   131/89 (!) 140/67   Pulse:   83 87   Resp: 18  (!) 34 20   Temp:  97.4 °F (36.3 °C)  97.7 °F (36.5 °C)   TempSrc:  Oral  Oral   SpO2: 99%  99% 99%   Weight:       Height:             MDM  Primary concern this patient is possible exacerbation of her previously diagnosed pneumonia. However, the patient's oxygenation is good. She has episodic significant tachypnea here in the emergency department which may be hyperventilation, however this should also be evaluated in the context of her known leukemia. Possibility of opportunistic infections as well as significant anemia also need to be considered. REASSESSMENT      Laboratory evaluation, ECG, and chest x-ray were normal.  The patient was able to ambulate in the emergency room without difficulty or respiratory distress. She has no hypoxia. Definitive etiology of her symptoms was not clearly identified here in the emergency department. However, the patient has no definitive abnormalities require mission to the hospital and I feel she is safe for discharge home with follow-up with her primary care physician and cardiologist.  She has been directed to come back the emergency department has any worsening symptoms or any worsening pain, fevers, worsening shortness of breath, or any other concerning symptoms. CONSULTS:  None    PROCEDURES:  Unless otherwise noted below, none     Procedures        FINAL IMPRESSION      1. Malaise and fatigue    2.  Acute hyperventilation          DISPOSITION/PLAN   DISPOSITION Decision To Discharge 11/08/2022 04:16:31 PM      PATIENT REFERRED TO:  Malena Lund[address]  [place] [address]  [phone]    Call       DISCHARGE MEDICATIONS:  Discharge Medication List as of 11/8/2022  4:49 PM        Controlled Substances Monitoring:     No flowsheet data found.     Robin Carcamo MD (electronically signed)  Attending Emergency Physician            Robin Carcamo MD  11/08/22 8209

## 2022-11-08 NOTE — CARE COORDINATION
Pt noted for possible readmission. Pt recently admitted 10/31-11/2 for bilateral community acquired pneumonia. Pt lives with son, DIL, and grand children. Pt has PCP, insurance and goes to the [place]. HHC were sent to the [place] on last d/c. Pt is independent in ADL's. DME includes walker, cane and shower chair. Pt returns today with complaints of malaise and increasing shortness of breath. Pt was evaluated and d/c home.

## 2022-11-13 LAB
CULTURE: NORMAL
CULTURE: NORMAL
Lab: NORMAL
Lab: NORMAL
SPECIMEN: NORMAL
SPECIMEN: NORMAL

## 2022-11-14 ENCOUNTER — TELEPHONE (OUTPATIENT)
Dept: CARDIOLOGY CLINIC | Age: 62
End: 2022-11-14

## 2022-11-17 ENCOUNTER — TELEPHONE (OUTPATIENT)
Dept: CARDIOLOGY CLINIC | Age: 62
End: 2022-11-17

## 2022-11-21 ENCOUNTER — TELEPHONE (OUTPATIENT)
Dept: CARDIOLOGY CLINIC | Age: 62
End: 2022-11-21

## 2022-11-21 NOTE — TELEPHONE ENCOUNTER
Left message for patient requesting a return call to schedule an office visit with current   No patient for presence of heart valve replacement per referral from [employer] at the [facility].

## 2022-11-23 ENCOUNTER — TELEPHONE (OUTPATIENT)
Dept: CARDIOLOGY CLINIC | Age: 62
End: 2022-11-23

## 2022-11-23 NOTE — TELEPHONE ENCOUNTER
Left message for patient requesting a return call to schedule an office visit with current   No patient for presence of heart valve replacement [facility]

## 2023-01-04 ENCOUNTER — OFFICE VISIT (OUTPATIENT)
Dept: CARDIOLOGY CLINIC | Age: 63
End: 2023-01-04
Payer: OTHER GOVERNMENT

## 2023-01-04 ENCOUNTER — TELEPHONE (OUTPATIENT)
Dept: CARDIOLOGY CLINIC | Age: 63
End: 2023-01-04

## 2023-01-04 VITALS
DIASTOLIC BLOOD PRESSURE: 80 MMHG | HEIGHT: 66 IN | HEART RATE: 76 BPM | BODY MASS INDEX: 32.78 KG/M2 | WEIGHT: 204 LBS | SYSTOLIC BLOOD PRESSURE: 130 MMHG

## 2023-01-04 DIAGNOSIS — I38 VHD (VALVULAR HEART DISEASE): Primary | ICD-10-CM

## 2023-01-04 PROCEDURE — 99214 OFFICE O/P EST MOD 30 MIN: CPT | Performed by: NURSE PRACTITIONER

## 2023-01-04 PROCEDURE — 93000 ELECTROCARDIOGRAM COMPLETE: CPT | Performed by: NURSE PRACTITIONER

## 2023-01-04 ASSESSMENT — ENCOUNTER SYMPTOMS
SHORTNESS OF BREATH: 0
ORTHOPNEA: 0

## 2023-01-04 NOTE — PROGRESS NOTES
1/4/2023  Primary cardiologist: Dr. Guillaume Jaeger:   Kelsie Michaels  is an established 63 y.o.  female here for a  follow up on hospital       SUBJECTIVE/OBJECTIVE:  Kelsie Michaels is a 63 y.o. female with a history of VHD s/p TAVR, chronic mild leukema on Gleevec. Kelsie Michaels underwent TAVR's in September 2017 with Dr. Jorge Peralta. HPI :   Kelsie Michaels reports she was in hospital in November 2022 with shortness of breath. She was noted to have hypokalemic. At that time she was noted to have mild elevation in troponins. She was also noted to have prolonged QTc. She is feeling better. Shortness of breath has improved. Able to walk from parking lot to office without shortness of breath. She denies chest pain. Review of Systems   Constitutional: Negative for diaphoresis and malaise/fatigue. Cardiovascular:  Negative for chest pain, claudication, dyspnea on exertion, irregular heartbeat, leg swelling, near-syncope, orthopnea, palpitations and paroxysmal nocturnal dyspnea. Respiratory:  Negative for shortness of breath. Neurological:  Negative for dizziness and light-headedness. Vitals:    01/04/23 1122   BP: 130/80   Pulse: 76   Weight: 204 lb (92.5 kg)   Height: 5' 6\" (1.676 m)     No flowsheet data found. Wt Readings from Last 3 Encounters:   01/04/23 204 lb (92.5 kg)   11/08/22 193 lb (87.5 kg)   10/31/22 197 lb (89.4 kg)     Body mass index is 32.93 kg/m². Physical Exam  Vitals reviewed. Constitutional:       Appearance: Normal appearance. HENT:      Head: Normocephalic and atraumatic. Mouth/Throat:      Mouth: Mucous membranes are moist.   Eyes:      Extraocular Movements: Extraocular movements intact. Pupils: Pupils are equal, round, and reactive to light. Neck:      Vascular: No carotid bruit. Cardiovascular:      Rate and Rhythm: Normal rate and regular rhythm. Pulses: Normal pulses. Pulmonary:      Effort: Pulmonary effort is normal.      Breath sounds: Normal breath sounds. No rales. Chest:      Chest wall: No tenderness. Abdominal:      General: There is no distension. Palpations: Abdomen is soft. Tenderness: There is no abdominal tenderness. Musculoskeletal:      Cervical back: No tenderness. Right lower leg: No edema. Left lower leg: No edema. Skin:     General: Skin is warm and dry. Capillary Refill: Capillary refill takes less than 2 seconds. Neurological:      General: No focal deficit present. Mental Status: She is alert and oriented to person, place, and time. Psychiatric:         Mood and Affect: Mood normal.         Behavior: Behavior normal.              Current Outpatient Medications   Medication Sig Dispense Refill    mirtazapine (REMERON) 30 MG tablet Take 30 mg by mouth nightly      metoprolol succinate (TOPROL XL) 25 MG extended release tablet Take 1 tablet by mouth daily 30 tablet 0    albuterol sulfate HFA (PROVENTIL;VENTOLIN;PROAIR) 108 (90 Base) MCG/ACT inhaler Inhale 2 puffs into the lungs every 4 hours (while awake) 18 g 3    ipratropium-albuterol (DUONEB) 0.5-2.5 (3) MG/3ML SOLN nebulizer solution Inhale 3 mLs into the lungs every 4 hours as needed for Shortness of Breath 360 mL 1    busPIRone (BUSPAR) 10 MG tablet Take 20 mg by mouth daily      PARoxetine (PAXIL) 30 MG tablet Take 30 mg by mouth every morning      aspirin 81 MG EC tablet Take 1 tablet by mouth daily 90 tablet 3    gabapentin (NEURONTIN) 400 MG capsule Take 400 mg by mouth 3 times daily      imatinib (GLEEVEC) 400 MG tablet Take 400 mg by mouth daily. potassium chloride (KLOR-CON M) 20 MEQ extended release tablet Take 1 tablet by mouth 2 times daily for 7 days (Patient not taking: Reported on 1/4/2023) 14 tablet 0     No current facility-administered medications for this visit. All pertinent data reviewed and discussed with patient       ASSESSMENT/PLAN:    RODRIGO   Bharathi Franks  is a status post TAVR (2017).   She recently was admitted to the hospital with shortness of breath. Limited echocardiogram showed elevated gradients across aortic valve: Aortic Valve   S/p TAVR (26 mm Medtronic Evolut PRO). No significant PVL. Mean P mmHg,   EDGARDO: 1.50 cm sq. Elevated gradients likely due to hemodynamic state. Now that  electrolyte abnormalities and pneumonia have resolved recommend check limited echo to reassess valve gradients  Continue toprol -tolerate    Prolonged QTC  During hospitalization was noted to have prolonged QTC. EKG today shows sinus rhythm with  msec    Leukemia  On Gleevec-followed with oncology through the [facility]    Tests ordered: Limited echocardiogram  Follow-up for results    Signed:  SANDER Rebolledo CNP, 2023, 11:44 AM    An electronic signature was used to authenticate this note. Please note this report has been partially produced using speech recognition software and may contain errors related to that system including errors in grammar, punctuation, and spelling, as well as words and phrases that may be inappropriate. If there are any questions or concerns please feel free to contact the dictating provider for clarification.

## 2023-02-01 ENCOUNTER — TELEPHONE (OUTPATIENT)
Dept: CARDIOLOGY CLINIC | Age: 63
End: 2023-02-01

## 2023-07-05 ENCOUNTER — HOSPITAL ENCOUNTER (OUTPATIENT)
Dept: WOMENS IMAGING | Age: 63
Discharge: HOME | End: 2023-07-05
Payer: OTHER GOVERNMENT

## 2023-07-05 DIAGNOSIS — Z12.31 ENCOUNTER FOR SCREENING MAMMOGRAM FOR MALIGNANT NEOPLASM OF BREAST: ICD-10-CM

## 2023-07-05 PROCEDURE — 77063 BREAST TOMOSYNTHESIS BI: CPT

## 2023-12-03 ENCOUNTER — HOSPITAL ENCOUNTER (INPATIENT)
Age: 63
LOS: 1 days | Discharge: HOME | DRG: 178 | End: 2023-12-05
Attending: STUDENT IN AN ORGANIZED HEALTH CARE EDUCATION/TRAINING PROGRAM | Admitting: INTERNAL MEDICINE
Payer: OTHER GOVERNMENT

## 2023-12-03 ENCOUNTER — APPOINTMENT (OUTPATIENT)
Dept: GENERAL RADIOLOGY | Age: 63
DRG: 178 | End: 2023-12-03
Payer: OTHER GOVERNMENT

## 2023-12-03 ENCOUNTER — APPOINTMENT (OUTPATIENT)
Dept: CT IMAGING | Age: 63
DRG: 178 | End: 2023-12-03
Payer: OTHER GOVERNMENT

## 2023-12-03 DIAGNOSIS — D70.1 CHEMOTHERAPY-INDUCED NEUTROPENIA (HCC): ICD-10-CM

## 2023-12-03 DIAGNOSIS — U07.1 COVID-19: ICD-10-CM

## 2023-12-03 DIAGNOSIS — T45.1X5A CHEMOTHERAPY-INDUCED NEUTROPENIA (HCC): ICD-10-CM

## 2023-12-03 DIAGNOSIS — C92.10 CML (CHRONIC MYELOCYTIC LEUKEMIA) (HCC): ICD-10-CM

## 2023-12-03 DIAGNOSIS — K76.0 FATTY LIVER: ICD-10-CM

## 2023-12-03 DIAGNOSIS — A41.9 SEPSIS WITHOUT ACUTE ORGAN DYSFUNCTION, DUE TO UNSPECIFIED ORGANISM (HCC): Primary | ICD-10-CM

## 2023-12-03 DIAGNOSIS — E87.6 HYPOKALEMIA: ICD-10-CM

## 2023-12-03 LAB
ALBUMIN SERPL-MCNC: 4.1 GM/DL (ref 3.4–5)
ALP BLD-CCNC: 119 IU/L (ref 40–128)
ALT SERPL-CCNC: 24 U/L (ref 10–40)
ANION GAP SERPL CALCULATED.3IONS-SCNC: 18 MMOL/L (ref 4–16)
AST SERPL-CCNC: 28 IU/L (ref 15–37)
B PARAP IS1001 DNA NPH QL NAA+NON-PROBE: NOT DETECTED
B PERT.PT PRMT NPH QL NAA+NON-PROBE: NOT DETECTED
BASE EXCESS MIXED: 5.9 (ref 0–2.3)
BASOPHILS ABSOLUTE: 0 K/CU MM
BASOPHILS RELATIVE PERCENT: 0.6 % (ref 0–1)
BILIRUB SERPL-MCNC: 0.4 MG/DL (ref 0–1)
BILIRUBIN URINE: NEGATIVE MG/DL
BLOOD, URINE: NEGATIVE
BUN SERPL-MCNC: 12 MG/DL (ref 6–23)
C PNEUM DNA NPH QL NAA+NON-PROBE: NOT DETECTED
CALCIUM SERPL-MCNC: 9.2 MG/DL (ref 8.3–10.6)
CHLORIDE BLD-SCNC: 98 MMOL/L (ref 99–110)
CLARITY: CLEAR
CO2: 22 MMOL/L (ref 21–32)
COLOR: YELLOW
COMMENT UA: NORMAL
COMMENT: ABNORMAL
CREAT SERPL-MCNC: 1.3 MG/DL (ref 0.6–1.1)
D DIMER: 1.25 UG/ML (FEU)
DIFFERENTIAL TYPE: ABNORMAL
EOSINOPHILS ABSOLUTE: 0 K/CU MM
EOSINOPHILS RELATIVE PERCENT: 0.3 % (ref 0–3)
FLUAV H1 2009 PAN RNA NPH NAA+NON-PROBE: NOT DETECTED
FLUAV H1 RNA NPH QL NAA+NON-PROBE: NOT DETECTED
FLUAV H3 RNA NPH QL NAA+NON-PROBE: NOT DETECTED
FLUAV RNA NPH QL NAA+NON-PROBE: NOT DETECTED
FLUBV RNA NPH QL NAA+NON-PROBE: NOT DETECTED
GFR SERPL CREATININE-BSD FRML MDRD: 47 ML/MIN/1.73M2
GLUCOSE SERPL-MCNC: 118 MG/DL (ref 70–99)
GLUCOSE, URINE: NEGATIVE MG/DL
HADV DNA NPH QL NAA+NON-PROBE: NOT DETECTED
HCO3 VENOUS: 27.5 MMOL/L (ref 19–25)
HCOV 229E RNA NPH QL NAA+NON-PROBE: NOT DETECTED
HCOV HKU1 RNA NPH QL NAA+NON-PROBE: NOT DETECTED
HCOV NL63 RNA NPH QL NAA+NON-PROBE: NOT DETECTED
HCOV OC43 RNA NPH QL NAA+NON-PROBE: NOT DETECTED
HCT VFR BLD CALC: 41.2 % (ref 37–47)
HEMOGLOBIN: 14.3 GM/DL (ref 12.5–16)
HMPV RNA NPH QL NAA+NON-PROBE: NOT DETECTED
HPIV1 RNA NPH QL NAA+NON-PROBE: NOT DETECTED
HPIV2 RNA NPH QL NAA+NON-PROBE: NOT DETECTED
HPIV3 RNA NPH QL NAA+NON-PROBE: NOT DETECTED
HPIV4 RNA NPH QL NAA+NON-PROBE: NOT DETECTED
IMMATURE NEUTROPHIL %: 0.3 % (ref 0–0.43)
KETONES, URINE: NEGATIVE MG/DL
LACTATE: 3.1 MMOL/L (ref 0.5–1.9)
LACTIC ACID, SEPSIS: 4.5 MMOL/L (ref 0.4–2)
LEUKOCYTE ESTERASE, URINE: NEGATIVE
LIPASE: 23 IU/L (ref 13–60)
LYMPHOCYTES ABSOLUTE: 1.6 K/CU MM
LYMPHOCYTES RELATIVE PERCENT: 45.8 % (ref 24–44)
M PNEUMO DNA NPH QL NAA+NON-PROBE: NOT DETECTED
MAGNESIUM: 2.8 MG/DL (ref 1.8–2.4)
MCH RBC QN AUTO: 31.1 PG (ref 27–31)
MCHC RBC AUTO-ENTMCNC: 34.7 % (ref 32–36)
MCV RBC AUTO: 89.6 FL (ref 78–100)
MONOCYTES ABSOLUTE: 0.5 K/CU MM
MONOCYTES RELATIVE PERCENT: 15.3 % (ref 0–4)
NITRITE URINE, QUANTITATIVE: NEGATIVE
NUCLEATED RBC %: 0 %
O2 SAT, VEN: 53 % (ref 50–70)
PCO2, VEN: 30 MMHG (ref 38–52)
PDW BLD-RTO: 14.1 % (ref 11.7–14.9)
PH VENOUS: 7.57 (ref 7.32–7.42)
PH, URINE: 6.5 (ref 5–8)
PLATELET # BLD: 173 K/CU MM (ref 140–440)
PMV BLD AUTO: 10.3 FL (ref 7.5–11.1)
PO2, VEN: 26 MMHG (ref 28–48)
POTASSIUM SERPL-SCNC: 2.6 MMOL/L (ref 3.5–5.1)
PRO-BNP: 2378 PG/ML
PROCALCITONIN SERPL-MCNC: 0.12 NG/ML
PROTEIN UA: NEGATIVE MG/DL
RBC # BLD: 4.6 M/CU MM (ref 4.2–5.4)
RSV RNA NPH QL NAA+NON-PROBE: NOT DETECTED
RV+EV RNA NPH QL NAA+NON-PROBE: NOT DETECTED
SARS-COV-2 RNA NPH QL NAA+NON-PROBE: ABNORMAL
SEGMENTED NEUTROPHILS ABSOLUTE COUNT: 1.3 K/CU MM
SEGMENTED NEUTROPHILS RELATIVE PERCENT: 37.7 % (ref 36–66)
SODIUM BLD-SCNC: 138 MMOL/L (ref 135–145)
SPECIFIC GRAVITY UA: <1.005 (ref 1–1.03)
TOTAL IMMATURE NEUTOROPHIL: 0.01 K/CU MM
TOTAL NUCLEATED RBC: 0 K/CU MM
TOTAL PROTEIN: 7.1 GM/DL (ref 6.4–8.2)
TROPONIN, HIGH SENSITIVITY: 27 NG/L (ref 0–14)
TROPONIN, HIGH SENSITIVITY: 33 NG/L (ref 0–14)
UROBILINOGEN, URINE: 0.2 MG/DL (ref 0.2–1)
WBC # BLD: 3.5 K/CU MM (ref 4–10.5)

## 2023-12-03 PROCEDURE — 99285 EMERGENCY DEPT VISIT HI MDM: CPT

## 2023-12-03 PROCEDURE — 84145 PROCALCITONIN (PCT): CPT

## 2023-12-03 PROCEDURE — 6370000000 HC RX 637 (ALT 250 FOR IP): Performed by: STUDENT IN AN ORGANIZED HEALTH CARE EDUCATION/TRAINING PROGRAM

## 2023-12-03 PROCEDURE — 83690 ASSAY OF LIPASE: CPT

## 2023-12-03 PROCEDURE — 87449 NOS EACH ORGANISM AG IA: CPT

## 2023-12-03 PROCEDURE — 71045 X-RAY EXAM CHEST 1 VIEW: CPT

## 2023-12-03 PROCEDURE — 6360000002 HC RX W HCPCS: Performed by: STUDENT IN AN ORGANIZED HEALTH CARE EDUCATION/TRAINING PROGRAM

## 2023-12-03 PROCEDURE — 6360000004 HC RX CONTRAST MEDICATION: Performed by: STUDENT IN AN ORGANIZED HEALTH CARE EDUCATION/TRAINING PROGRAM

## 2023-12-03 PROCEDURE — 82805 BLOOD GASES W/O2 SATURATION: CPT

## 2023-12-03 PROCEDURE — G0378 HOSPITAL OBSERVATION PER HR: HCPCS

## 2023-12-03 PROCEDURE — 96366 THER/PROPH/DIAG IV INF ADDON: CPT

## 2023-12-03 PROCEDURE — 84132 ASSAY OF SERUM POTASSIUM: CPT

## 2023-12-03 PROCEDURE — 2580000003 HC RX 258

## 2023-12-03 PROCEDURE — 87040 BLOOD CULTURE FOR BACTERIA: CPT

## 2023-12-03 PROCEDURE — 2580000003 HC RX 258: Performed by: STUDENT IN AN ORGANIZED HEALTH CARE EDUCATION/TRAINING PROGRAM

## 2023-12-03 PROCEDURE — 96368 THER/DIAG CONCURRENT INF: CPT

## 2023-12-03 PROCEDURE — 80053 COMPREHEN METABOLIC PANEL: CPT

## 2023-12-03 PROCEDURE — 6360000002 HC RX W HCPCS

## 2023-12-03 PROCEDURE — 96376 TX/PRO/DX INJ SAME DRUG ADON: CPT

## 2023-12-03 PROCEDURE — 81003 URINALYSIS AUTO W/O SCOPE: CPT

## 2023-12-03 PROCEDURE — 96361 HYDRATE IV INFUSION ADD-ON: CPT

## 2023-12-03 PROCEDURE — 96365 THER/PROPH/DIAG IV INF INIT: CPT

## 2023-12-03 PROCEDURE — 85379 FIBRIN DEGRADATION QUANT: CPT

## 2023-12-03 PROCEDURE — 0202U NFCT DS 22 TRGT SARS-COV-2: CPT

## 2023-12-03 PROCEDURE — 96367 TX/PROPH/DG ADDL SEQ IV INF: CPT

## 2023-12-03 PROCEDURE — 85025 COMPLETE CBC W/AUTO DIFF WBC: CPT

## 2023-12-03 PROCEDURE — 87324 CLOSTRIDIUM AG IA: CPT

## 2023-12-03 PROCEDURE — 96375 TX/PRO/DX INJ NEW DRUG ADDON: CPT

## 2023-12-03 PROCEDURE — 84484 ASSAY OF TROPONIN QUANT: CPT

## 2023-12-03 PROCEDURE — 83605 ASSAY OF LACTIC ACID: CPT

## 2023-12-03 PROCEDURE — 83735 ASSAY OF MAGNESIUM: CPT

## 2023-12-03 PROCEDURE — 74177 CT ABD & PELVIS W/CONTRAST: CPT

## 2023-12-03 PROCEDURE — 71275 CT ANGIOGRAPHY CHEST: CPT

## 2023-12-03 PROCEDURE — 83880 ASSAY OF NATRIURETIC PEPTIDE: CPT

## 2023-12-03 PROCEDURE — 93005 ELECTROCARDIOGRAM TRACING: CPT

## 2023-12-03 RX ORDER — SODIUM CHLORIDE 0.9 % (FLUSH) 0.9 %
5-40 SYRINGE (ML) INJECTION EVERY 12 HOURS SCHEDULED
Status: DISCONTINUED | OUTPATIENT
Start: 2023-12-03 | End: 2023-12-05 | Stop reason: HOSPADM

## 2023-12-03 RX ORDER — POTASSIUM CHLORIDE 7.45 MG/ML
10 INJECTION INTRAVENOUS
Status: DISCONTINUED | OUTPATIENT
Start: 2023-12-03 | End: 2023-12-03

## 2023-12-03 RX ORDER — SODIUM CHLORIDE, SODIUM LACTATE, POTASSIUM CHLORIDE, CALCIUM CHLORIDE 600; 310; 30; 20 MG/100ML; MG/100ML; MG/100ML; MG/100ML
INJECTION, SOLUTION INTRAVENOUS CONTINUOUS
Status: ACTIVE | OUTPATIENT
Start: 2023-12-03 | End: 2023-12-04

## 2023-12-03 RX ORDER — SODIUM CHLORIDE 0.9 % (FLUSH) 0.9 %
5-40 SYRINGE (ML) INJECTION PRN
Status: DISCONTINUED | OUTPATIENT
Start: 2023-12-03 | End: 2023-12-05 | Stop reason: HOSPADM

## 2023-12-03 RX ORDER — POTASSIUM CHLORIDE 7.45 MG/ML
10 INJECTION INTRAVENOUS
Status: COMPLETED | OUTPATIENT
Start: 2023-12-03 | End: 2023-12-04

## 2023-12-03 RX ORDER — ALBUTEROL SULFATE 90 UG/1
2 AEROSOL, METERED RESPIRATORY (INHALATION)
Status: DISCONTINUED | OUTPATIENT
Start: 2023-12-04 | End: 2023-12-05 | Stop reason: HOSPADM

## 2023-12-03 RX ORDER — POTASSIUM CHLORIDE 20 MEQ/1
40 TABLET, EXTENDED RELEASE ORAL ONCE
Status: DISCONTINUED | OUTPATIENT
Start: 2023-12-03 | End: 2023-12-04

## 2023-12-03 RX ORDER — ACETAMINOPHEN 325 MG/1
650 TABLET ORAL EVERY 6 HOURS PRN
Status: DISCONTINUED | OUTPATIENT
Start: 2023-12-03 | End: 2023-12-05 | Stop reason: HOSPADM

## 2023-12-03 RX ORDER — 0.9 % SODIUM CHLORIDE 0.9 %
30 INTRAVENOUS SOLUTION INTRAVENOUS ONCE
Status: COMPLETED | OUTPATIENT
Start: 2023-12-03 | End: 2023-12-03

## 2023-12-03 RX ORDER — GABAPENTIN 400 MG/1
400 CAPSULE ORAL 3 TIMES DAILY
Status: DISCONTINUED | OUTPATIENT
Start: 2023-12-03 | End: 2023-12-05 | Stop reason: HOSPADM

## 2023-12-03 RX ORDER — ACETAMINOPHEN 650 MG/1
650 SUPPOSITORY RECTAL EVERY 6 HOURS PRN
Status: DISCONTINUED | OUTPATIENT
Start: 2023-12-03 | End: 2023-12-05 | Stop reason: HOSPADM

## 2023-12-03 RX ORDER — MAGNESIUM SULFATE IN WATER 40 MG/ML
2000 INJECTION, SOLUTION INTRAVENOUS PRN
Status: DISCONTINUED | OUTPATIENT
Start: 2023-12-03 | End: 2023-12-05 | Stop reason: HOSPADM

## 2023-12-03 RX ORDER — ACETAMINOPHEN 500 MG
1000 TABLET ORAL ONCE
Status: DISCONTINUED | OUTPATIENT
Start: 2023-12-03 | End: 2023-12-05 | Stop reason: HOSPADM

## 2023-12-03 RX ORDER — ENOXAPARIN SODIUM 100 MG/ML
40 INJECTION SUBCUTANEOUS EVERY EVENING
Status: DISCONTINUED | OUTPATIENT
Start: 2023-12-04 | End: 2023-12-05 | Stop reason: HOSPADM

## 2023-12-03 RX ORDER — MAGNESIUM SULFATE IN WATER 40 MG/ML
2000 INJECTION, SOLUTION INTRAVENOUS ONCE
Status: COMPLETED | OUTPATIENT
Start: 2023-12-03 | End: 2023-12-03

## 2023-12-03 RX ORDER — ONDANSETRON 2 MG/ML
4 INJECTION INTRAMUSCULAR; INTRAVENOUS ONCE
Status: COMPLETED | OUTPATIENT
Start: 2023-12-03 | End: 2023-12-03

## 2023-12-03 RX ORDER — 0.9 % SODIUM CHLORIDE 0.9 %
1000 INTRAVENOUS SOLUTION INTRAVENOUS ONCE
Status: COMPLETED | OUTPATIENT
Start: 2023-12-03 | End: 2023-12-03

## 2023-12-03 RX ORDER — POTASSIUM CHLORIDE 20 MEQ/1
40 TABLET, EXTENDED RELEASE ORAL ONCE
Status: DISCONTINUED | OUTPATIENT
Start: 2023-12-03 | End: 2023-12-03

## 2023-12-03 RX ORDER — ASPIRIN 81 MG/1
81 TABLET, CHEWABLE ORAL DAILY
Status: DISCONTINUED | OUTPATIENT
Start: 2023-12-04 | End: 2023-12-05 | Stop reason: HOSPADM

## 2023-12-03 RX ORDER — METOPROLOL SUCCINATE 25 MG/1
25 TABLET, EXTENDED RELEASE ORAL DAILY
Status: DISCONTINUED | OUTPATIENT
Start: 2023-12-04 | End: 2023-12-05 | Stop reason: HOSPADM

## 2023-12-03 RX ORDER — POTASSIUM CHLORIDE 7.45 MG/ML
10 INJECTION INTRAVENOUS PRN
Status: DISCONTINUED | OUTPATIENT
Start: 2023-12-03 | End: 2023-12-05 | Stop reason: HOSPADM

## 2023-12-03 RX ORDER — ONDANSETRON 2 MG/ML
4 INJECTION INTRAMUSCULAR; INTRAVENOUS EVERY 6 HOURS PRN
Status: DISCONTINUED | OUTPATIENT
Start: 2023-12-03 | End: 2023-12-05 | Stop reason: HOSPADM

## 2023-12-03 RX ORDER — POTASSIUM CHLORIDE 20 MEQ/1
20 TABLET, EXTENDED RELEASE ORAL 2 TIMES DAILY
Status: DISCONTINUED | OUTPATIENT
Start: 2023-12-04 | End: 2023-12-05 | Stop reason: HOSPADM

## 2023-12-03 RX ORDER — KETOROLAC TROMETHAMINE 15 MG/ML
15 INJECTION, SOLUTION INTRAMUSCULAR; INTRAVENOUS ONCE
Status: COMPLETED | OUTPATIENT
Start: 2023-12-03 | End: 2023-12-03

## 2023-12-03 RX ORDER — ONDANSETRON 4 MG/1
4 TABLET, ORALLY DISINTEGRATING ORAL EVERY 8 HOURS PRN
Status: DISCONTINUED | OUTPATIENT
Start: 2023-12-03 | End: 2023-12-05 | Stop reason: HOSPADM

## 2023-12-03 RX ORDER — IMATINIB MESYLATE 400 MG/1
400 TABLET, FILM COATED ORAL DAILY
Status: DISCONTINUED | OUTPATIENT
Start: 2023-12-04 | End: 2023-12-05 | Stop reason: HOSPADM

## 2023-12-03 RX ORDER — MIRTAZAPINE 15 MG/1
30 TABLET, FILM COATED ORAL NIGHTLY
Status: DISCONTINUED | OUTPATIENT
Start: 2023-12-03 | End: 2023-12-05 | Stop reason: HOSPADM

## 2023-12-03 RX ORDER — SODIUM CHLORIDE 9 MG/ML
INJECTION, SOLUTION INTRAVENOUS PRN
Status: DISCONTINUED | OUTPATIENT
Start: 2023-12-03 | End: 2023-12-05 | Stop reason: HOSPADM

## 2023-12-03 RX ADMIN — ONDANSETRON 4 MG: 2 INJECTION INTRAMUSCULAR; INTRAVENOUS at 15:50

## 2023-12-03 RX ADMIN — MAGNESIUM SULFATE HEPTAHYDRATE 2000 MG: 40 INJECTION, SOLUTION INTRAVENOUS at 16:27

## 2023-12-03 RX ADMIN — POTASSIUM CHLORIDE 10 MEQ: 7.46 INJECTION, SOLUTION INTRAVENOUS at 17:07

## 2023-12-03 RX ADMIN — SODIUM CHLORIDE, SODIUM LACTATE, POTASSIUM CHLORIDE, CALCIUM CHLORIDE: 600; 310; 30; 20 INJECTION, SOLUTION INTRAVENOUS at 22:46

## 2023-12-03 RX ADMIN — ONDANSETRON 4 MG: 2 INJECTION INTRAMUSCULAR; INTRAVENOUS at 21:45

## 2023-12-03 RX ADMIN — SODIUM CHLORIDE 1000 ML: 9 INJECTION, SOLUTION INTRAVENOUS at 15:49

## 2023-12-03 RX ADMIN — CEFEPIME 2000 MG: 2 INJECTION, POWDER, FOR SOLUTION INTRAVENOUS at 15:50

## 2023-12-03 RX ADMIN — SODIUM CHLORIDE, PRESERVATIVE FREE 10 ML: 5 INJECTION INTRAVENOUS at 22:48

## 2023-12-03 RX ADMIN — POTASSIUM CHLORIDE 10 MEQ: 7.46 INJECTION, SOLUTION INTRAVENOUS at 21:56

## 2023-12-03 RX ADMIN — MIRTAZAPINE 30 MG: 15 TABLET, FILM COATED ORAL at 21:46

## 2023-12-03 RX ADMIN — GABAPENTIN 400 MG: 400 CAPSULE ORAL at 21:46

## 2023-12-03 RX ADMIN — SODIUM CHLORIDE 1779 ML: 9 INJECTION, SOLUTION INTRAVENOUS at 14:35

## 2023-12-03 RX ADMIN — KETOROLAC TROMETHAMINE 15 MG: 15 INJECTION, SOLUTION INTRAMUSCULAR; INTRAVENOUS at 16:23

## 2023-12-03 RX ADMIN — IOPAMIDOL 75 ML: 755 INJECTION, SOLUTION INTRAVENOUS at 17:57

## 2023-12-03 RX ADMIN — VANCOMYCIN HYDROCHLORIDE 2000 MG: 5 INJECTION, POWDER, LYOPHILIZED, FOR SOLUTION INTRAVENOUS at 18:17

## 2023-12-03 RX ADMIN — POTASSIUM CHLORIDE 10 MEQ: 7.46 INJECTION, SOLUTION INTRAVENOUS at 23:40

## 2023-12-03 NOTE — H&P
History and Physical      Name:  Wendy Fay /Age/Sex: 1960  (63 y.o. female)   MRN & CSN:  [id number] & [id number] Encounter Date/Time: 12/3/2023 6:44 PM EST   Location:  ED31/ED-31 PCP: Vikas Mcconnell Day: 1    Assessment and Plan:     Patient is a 28-year-old female who presented with fatigue. # Acute on chronic hypokalemia  # Premature ventricular complexes   - Endorsed generalized weakness, emesis and loose stools (2 BM's daily). Chronic issue with recent admission for similar presentation.   - Moderate. Repleted, follow-up repeat labs. Continue Toprol-XL. # COVID-19 infection  - Endorse non-productive cough for x3 days but no SOB, fevers or other complaints. - On RA, steroids not indicated. - Keep in isolation, likely until 12/10. Supportive care overnight. # Elevated lactic acid  - No source of bacterial infection.  - Improved with IVF. - Antibiotics not indicated. # [id number]  - Overall stable, avoid nephrotoxic medications.   - Received IV contrast in ED. # Non-MI troponin elevation  - Denied any typical CP. Negative  LHC in 2017. - Initial Tn mildly elevated, repeat flat. ECG without acute ischemic changes. - Likely secondary to above. # Hx of AVR  - Continue ASA and Toprol-XL. # CML  - Continue Gleevex. Checklist:  Advanced directive: full  Diet: cardiac  DVT ppx: Lovenox    Disposition: place in observation. Estimated discharge: 1 day(s). Current living situation: home. Expected disposition: home. Spoke with ED provider who recommended admission for the patient and I agree with that plan. Personally reviewed lab studies and imaging. EKG interpreted personally and results as stated above. Imaging that was interpreted personally and results as stated above.     History of Present Illness:     Chief Complaint: weakness    Patient is a 28-year-old female with a PMHx of AVR in 2017, CML, HTN, CKD3a, anxiety/depression and class II

## 2023-12-03 NOTE — ACP (ADVANCE CARE PLANNING)
Patient does not have any ACP documents/Medical Power of . LSW notes hospital will follow [place] Next of Kin hierarchy in the following descending order for priority:    Guardian  Spouse  Majority of adult Children  Parents  Majority of adult Siblings  Nearest Relative not described above    Per [place] Next of Kin hierarchy: Patients' adult child will be [address]

## 2023-12-03 NOTE — ED PROVIDER NOTES
warm and dry. Neurological:      Mental Status: She is alert and oriented to person, place, and time. Mental status is at baseline. Psychiatric:         Mood and Affect: Mood normal.         Behavior: Behavior normal.         Thought Content: Thought content normal.         Judgment: Judgment normal.               MDM, CC/HPI Summary, DDx, ED Course, and Reassessment:    Patient is a 66 y.o. female who was seen and evaluated in the emergency department for generalized bodyaches. Patient is immunocompromised. Lab work came back revealing leukopenia, she was also COVID-positive, significant hypokalemia and lactic acidosis. There was concern for PE as well as intra-abdominal pathology so we ordered for CTA and CT, CT of the abdomen pelvis showed some potential colitis. Given her leukopenia, lactic acidosis and immunocompromise status there was concern for sepsis so she was given 30 cc per kilogram IV fluids as well as broad-spectrum antibiotics. Potassium replacement was ordered. She also had mildly elevation in her troponin likely secondary to demand. Given concern for sepsis we feel she would benefit from admission for further evaluation and management. Patient to be admitted. Previous records reviewed:  Patient was seen 11/8/2022 for emergency medicine visit regarding malaise and fatigue . Records Reviewed : External ED Note    Chronic conditions affecting care: CML  Disposition Considerations (tests considered but not done, Shared Decision Making, Pt Expectation of Test or Tx.):     History from: see HPI & ED course  History from : Patient and Family    Limitations to history : None  Discussion with Other Profesionals : Admitting Team      The patient was seen and examined unless otherwise specified. Appropriate diagnostic testing was performed and results reviewed with the patient.   My independent review and interpretation of data, imaging, labs and diagnostic evaluations are as above/below and feel free to contact the dictating provider for clarification.        Manual MD Queenie  Resident  12/10/23 [id number]

## 2023-12-04 LAB
ANION GAP SERPL CALCULATED.3IONS-SCNC: 10 MMOL/L (ref 4–16)
ANION GAP SERPL CALCULATED.3IONS-SCNC: 10 MMOL/L (ref 4–16)
BASOPHILS ABSOLUTE: 0 K/CU MM
BASOPHILS RELATIVE PERCENT: 0.9 % (ref 0–1)
BUN SERPL-MCNC: 10 MG/DL (ref 6–23)
BUN SERPL-MCNC: 8 MG/DL (ref 6–23)
C DIFF AG + TOXIN: NORMAL
CALCIUM SERPL-MCNC: 7.4 MG/DL (ref 8.3–10.6)
CALCIUM SERPL-MCNC: 7.7 MG/DL (ref 8.3–10.6)
CHLORIDE BLD-SCNC: 106 MMOL/L (ref 99–110)
CHLORIDE BLD-SCNC: 106 MMOL/L (ref 99–110)
CO2: 23 MMOL/L (ref 21–32)
CO2: 25 MMOL/L (ref 21–32)
CREAT SERPL-MCNC: 1.1 MG/DL (ref 0.6–1.1)
CREAT SERPL-MCNC: 1.2 MG/DL (ref 0.6–1.1)
DIFFERENTIAL TYPE: ABNORMAL
EKG ATRIAL RATE: 147 BPM
EKG DIAGNOSIS: NORMAL
EKG P AXIS: 101 DEGREES
EKG P-R INTERVAL: 134 MS
EKG Q-T INTERVAL: 378 MS
EKG QRS DURATION: 80 MS
EKG QTC CALCULATION (BAZETT): 497 MS
EKG R AXIS: 200 DEGREES
EKG T AXIS: 125 DEGREES
EKG VENTRICULAR RATE: 104 BPM
EOSINOPHILS ABSOLUTE: 0.1 K/CU MM
EOSINOPHILS RELATIVE PERCENT: 2 % (ref 0–3)
GFR SERPL CREATININE-BSD FRML MDRD: 52 ML/MIN/1.73M2
GFR SERPL CREATININE-BSD FRML MDRD: 57 ML/MIN/1.73M2
GLUCOSE SERPL-MCNC: 103 MG/DL (ref 70–99)
GLUCOSE SERPL-MCNC: 108 MG/DL (ref 70–99)
HCT VFR BLD CALC: 33.7 % (ref 37–47)
HEMOGLOBIN: 11.6 GM/DL (ref 12.5–16)
IMMATURE NEUTROPHIL %: 0 % (ref 0–0.43)
LACTATE: 2.1 MMOL/L (ref 0.5–1.9)
LYMPHOCYTES ABSOLUTE: 1.5 K/CU MM
LYMPHOCYTES RELATIVE PERCENT: 43.6 % (ref 24–44)
MAGNESIUM: 2.6 MG/DL (ref 1.8–2.4)
MCH RBC QN AUTO: 32 PG (ref 27–31)
MCHC RBC AUTO-ENTMCNC: 34.4 % (ref 32–36)
MCV RBC AUTO: 92.8 FL (ref 78–100)
MONOCYTES ABSOLUTE: 0.5 K/CU MM
MONOCYTES RELATIVE PERCENT: 15.3 % (ref 0–4)
NUCLEATED RBC %: 0 %
PDW BLD-RTO: 14.2 % (ref 11.7–14.9)
PLATELET # BLD: 146 K/CU MM (ref 140–440)
PMV BLD AUTO: 10.7 FL (ref 7.5–11.1)
POTASSIUM SERPL-SCNC: 2.9 MMOL/L (ref 3.5–5.1)
POTASSIUM SERPL-SCNC: 3.1 MMOL/L (ref 3.5–5.1)
RBC # BLD: 3.63 M/CU MM (ref 4.2–5.4)
SEGMENTED NEUTROPHILS ABSOLUTE COUNT: 1.3 K/CU MM
SEGMENTED NEUTROPHILS RELATIVE PERCENT: 38.2 % (ref 36–66)
SODIUM BLD-SCNC: 139 MMOL/L (ref 135–145)
SODIUM BLD-SCNC: 141 MMOL/L (ref 135–145)
SOURCE: NORMAL
TOTAL IMMATURE NEUTOROPHIL: 0 K/CU MM
TOTAL NUCLEATED RBC: 0 K/CU MM
WBC # BLD: 3.5 K/CU MM (ref 4–10.5)

## 2023-12-04 PROCEDURE — 96376 TX/PRO/DX INJ SAME DRUG ADON: CPT

## 2023-12-04 PROCEDURE — 96361 HYDRATE IV INFUSION ADD-ON: CPT

## 2023-12-04 PROCEDURE — 36415 COLL VENOUS BLD VENIPUNCTURE: CPT

## 2023-12-04 PROCEDURE — 6360000002 HC RX W HCPCS: Performed by: STUDENT IN AN ORGANIZED HEALTH CARE EDUCATION/TRAINING PROGRAM

## 2023-12-04 PROCEDURE — 96366 THER/PROPH/DIAG IV INF ADDON: CPT

## 2023-12-04 PROCEDURE — G0378 HOSPITAL OBSERVATION PER HR: HCPCS

## 2023-12-04 PROCEDURE — 6370000000 HC RX 637 (ALT 250 FOR IP): Performed by: STUDENT IN AN ORGANIZED HEALTH CARE EDUCATION/TRAINING PROGRAM

## 2023-12-04 PROCEDURE — 84132 ASSAY OF SERUM POTASSIUM: CPT

## 2023-12-04 PROCEDURE — 94640 AIRWAY INHALATION TREATMENT: CPT

## 2023-12-04 PROCEDURE — 83735 ASSAY OF MAGNESIUM: CPT

## 2023-12-04 PROCEDURE — 80048 BASIC METABOLIC PNL TOTAL CA: CPT

## 2023-12-04 PROCEDURE — 85025 COMPLETE CBC W/AUTO DIFF WBC: CPT

## 2023-12-04 PROCEDURE — 96372 THER/PROPH/DIAG INJ SC/IM: CPT

## 2023-12-04 PROCEDURE — 2580000003 HC RX 258: Performed by: STUDENT IN AN ORGANIZED HEALTH CARE EDUCATION/TRAINING PROGRAM

## 2023-12-04 PROCEDURE — 93010 ELECTROCARDIOGRAM REPORT: CPT | Performed by: INTERNAL MEDICINE

## 2023-12-04 PROCEDURE — 83605 ASSAY OF LACTIC ACID: CPT

## 2023-12-04 RX ORDER — POTASSIUM CHLORIDE 7.45 MG/ML
10 INJECTION INTRAVENOUS
Status: DISPENSED | OUTPATIENT
Start: 2023-12-04 | End: 2023-12-04

## 2023-12-04 RX ORDER — POTASSIUM CHLORIDE 20 MEQ/1
40 TABLET, EXTENDED RELEASE ORAL ONCE
Status: DISCONTINUED | OUTPATIENT
Start: 2023-12-04 | End: 2023-12-04

## 2023-12-04 RX ADMIN — POTASSIUM CHLORIDE 20 MEQ: 1500 TABLET, EXTENDED RELEASE ORAL at 20:13

## 2023-12-04 RX ADMIN — POTASSIUM CHLORIDE 10 MEQ: 7.46 INJECTION, SOLUTION INTRAVENOUS at 20:16

## 2023-12-04 RX ADMIN — POTASSIUM CHLORIDE 10 MEQ: 7.46 INJECTION, SOLUTION INTRAVENOUS at 14:12

## 2023-12-04 RX ADMIN — ALBUTEROL SULFATE 2 PUFF: 90 AEROSOL, METERED RESPIRATORY (INHALATION) at 08:19

## 2023-12-04 RX ADMIN — ONDANSETRON 4 MG: 2 INJECTION INTRAMUSCULAR; INTRAVENOUS at 12:53

## 2023-12-04 RX ADMIN — POTASSIUM CHLORIDE 10 MEQ: 7.46 INJECTION, SOLUTION INTRAVENOUS at 18:54

## 2023-12-04 RX ADMIN — MIRTAZAPINE 30 MG: 15 TABLET, FILM COATED ORAL at 20:12

## 2023-12-04 RX ADMIN — ALBUTEROL SULFATE 2 PUFF: 90 AEROSOL, METERED RESPIRATORY (INHALATION) at 16:29

## 2023-12-04 RX ADMIN — GABAPENTIN 400 MG: 400 CAPSULE ORAL at 14:11

## 2023-12-04 RX ADMIN — POTASSIUM CHLORIDE 10 MEQ: 7.46 INJECTION, SOLUTION INTRAVENOUS at 12:20

## 2023-12-04 RX ADMIN — POTASSIUM CHLORIDE 10 MEQ: 7.46 INJECTION, SOLUTION INTRAVENOUS at 02:26

## 2023-12-04 RX ADMIN — POTASSIUM CHLORIDE 10 MEQ: 7.46 INJECTION, SOLUTION INTRAVENOUS at 09:43

## 2023-12-04 RX ADMIN — ENOXAPARIN SODIUM 40 MG: 100 INJECTION SUBCUTANEOUS at 20:13

## 2023-12-04 RX ADMIN — GABAPENTIN 400 MG: 400 CAPSULE ORAL at 09:38

## 2023-12-04 RX ADMIN — GABAPENTIN 400 MG: 400 CAPSULE ORAL at 20:12

## 2023-12-04 RX ADMIN — SODIUM CHLORIDE, PRESERVATIVE FREE 10 ML: 5 INJECTION INTRAVENOUS at 20:13

## 2023-12-04 RX ADMIN — POTASSIUM CHLORIDE 10 MEQ: 7.46 INJECTION, SOLUTION INTRAVENOUS at 01:06

## 2023-12-04 RX ADMIN — ALBUTEROL SULFATE 2 PUFF: 90 AEROSOL, METERED RESPIRATORY (INHALATION) at 19:11

## 2023-12-04 RX ADMIN — ALBUTEROL SULFATE 2 PUFF: 90 AEROSOL, METERED RESPIRATORY (INHALATION) at 11:54

## 2023-12-04 NOTE — ED PROVIDER NOTES
extended release tablet Take 1 tablet by mouth daily  Qty: 30 tablet, Refills: 0      potassium chloride (KLOR-CON M) 20 MEQ extended release tablet Take 1 tablet by mouth 2 times daily for 7 days  Qty: 14 tablet, Refills: 0      albuterol sulfate HFA (PROVENTIL;VENTOLIN;PROAIR) 108 (90 Base) MCG/ACT inhaler Inhale 2 puffs into the lungs every 4 hours (while awake)  Qty: 18 g, Refills: 3      ipratropium-albuterol (DUONEB) 0.5-2.5 (3) MG/3ML SOLN nebulizer solution Inhale 3 mLs into the lungs every 4 hours as needed for Shortness of Breath  Qty: 360 mL, Refills: 1      busPIRone (BUSPAR) 10 MG tablet Take 2 tablets by mouth daily      PARoxetine (PAXIL) 30 MG tablet Take 1 tablet by mouth every morning      aspirin 81 MG EC tablet Take 1 tablet by mouth daily  Qty: 90 tablet, Refills: 3      gabapentin (NEURONTIN) 400 MG capsule Take 1 capsule by mouth 3 times daily.       imatinib (GLEEVEC) 400 MG tablet Take 1 tablet by mouth daily             ALLERGIES     Ampicillin, Bee venom, Penicillins, Typhoid vaccines, Codeine, Morphine, Tape [adhesive tape], and Vicodin [hydrocodone-acetaminophen]    FAMILYHISTORY       Family History   Problem Relation Age of Onset    Cancer Mother     Kidney Disease Mother     Heart Disease Maternal Grandmother     Breast Cancer Neg Hx     Ovarian Cancer Neg Hx         SOCIAL HISTORY       Social History     Tobacco Use    Smoking status: Former     Packs/day: 0.50     Years: 38.00     Additional pack years: 0.00     Total pack years: 19.00     Types: Cigarettes    Smokeless tobacco: Never   Vaping Use    Vaping Use: Every day    Substances: Nicotine    Passive vaping exposure: Yes   Substance Use Topics    Alcohol use: No    Drug use: Yes     Types: Marijuana (Weed)       SCREENINGS        Mary Coma Scale  Eye Opening: Spontaneous  Best Verbal Response: Oriented  Best Motor Response: Obeys commands  Mary Coma Scale Score: 15                CIWA Assessment  BP: 135/77  Pulse: 95 sepsis. Must meet 2:    [] Temperature > 100.9 F (38.3 C)        or < 96.8 F (36 C)  [x] HR > 90  [x] RR > 20  [x] WBC > 12 or < 4 or 10% bands      AND:      [x] Infection Confirmed or        Suspected. Must meet 1:    [x] Lactate > 2       or   [] Signs of Organ Dysfunction:    - SBP < 90 or MAP < 65  - Altered mental status  - Creatinine > 2 or increased from      baseline  - Urine Output < 0.5 ml/kg/hr  - Bilirubin > 2  - INR > 1.5 (not anticoagulated)  - Platelets < 985,899  - Acute Respiratory Failure as     evidenced by new need for NIPPV     or mechanical ventilation      [] No criteria met for Severe Sepsis. Must meet 1:    [] Lactate > 4        or   [] SBP < 90 or MAP < 65 for at        least two readings in the first        hour after fluid bolus        administration      [] Vasopressors initiated (if hypotension persists after fluid resuscitation)        [] No criteria met for Septic Shock. Patient Vitals for the past 6 hrs:   BP Pulse Resp SpO2   12/04/23 1436 135/77 95 18 95 %      Recent Labs     12/03/23  1437 12/04/23  0032 12/04/23  1100   WBC 3.5* 3.5*  --    CREATININE 1.3* 1.2* 1.1   BILITOT 0.4  --   --     146  --          Time Severe Sepsis Identified: 1521    Fluid Resuscitation Rational: less than 30cc ordered at time of admission due to patient history, unsure of cardiac status at time of admission, 1L NS was ordered. Repeat lactate level: ordered and pending at this time    Reassessment Exam:   Not applicable. Patient does not have septic shock. Chronic Conditions affecting care:    has a past medical history of Aortic valve disease, CML (chronic myelocytic leukemia) ([address]), H/O cardiac catheterization (08/04/2017), H/O echocardiogram (05/2018), Leukemia ([address]), Leukemia ([address]), Lung mass, and Ovarian cancer ([address]).     CONSULTS: (Who and What was discussed)    To inpatient hospitalist for admission to hospital.    Records Reviewed (External and Source)

## 2023-12-04 NOTE — PLAN OF CARE
Problem: Discharge Planning  Goal: Discharge to home or other facility with appropriate resources  12/4/2023 1019 by Juan Joy RN  Outcome: Progressing  12/3/2023 2353 by Deborah Reid RN  Outcome: Progressing     Problem: Risk for Elopement  Goal: Patient will not exit the unit/facility without proper excort  12/4/2023 1019 by Juan Joy RN  Outcome: Progressing  12/3/2023 2353 by Deborah Reid RN  Outcome: Progressing  Flowsheets (Taken 12/3/2023 2300)  Nursing Interventions for Elopement Risk: Assist with personal care needs such as toileting, eating, dressing, as needed to reduce the risk of wandering     Problem: Safety - Adult  Goal: Free from fall injury  12/4/2023 1019 by Juan Joy RN  Outcome: Progressing  12/3/2023 2353 by Deborah Reid RN  Outcome: Progressing

## 2023-12-04 NOTE — PROGRESS NOTES
4 Eyes Skin Assessment     NAME:  Celi Coe  YOB: 1960  MEDICAL RECORD NUMBER:  [id number]    The patient is being assessed for  Admission    I agree that at least one RN has performed a thorough Head to Toe Skin Assessment on the patient. ALL assessment sites listed below have been assessed. Areas assessed by both nurses:    Head, Face, Ears, Shoulders, Back, Chest, Arms, Elbows, Hands, Sacrum. Buttock, Coccyx, Ischium, Legs. Feet and Heels, and Under Medical Devices         Does the Patient have a Wound?  No noted wound(s)       Gagan Prevention initiated by RN: Yes  Wound Care Orders initiated by RN: No    Pressure Injury (Stage 3,4, Unstageable, DTI, NWPT, and Complex wounds) if present, place Wound referral order by RN under : No    New Ostomies, if present place, Ostomy referral order under : No     Nurse 1 eSignature: Electronically signed by Daron Doolye RN on 12/3/23 at 8:50 PM EST    **SHARE this note so that the co-signing nurse can place an eSignature**    Nurse 2 eSignature: Electronically signed by Juanita Stapleton RN on 12/4/23 at 1:06 AM EST

## 2023-12-04 NOTE — CARE COORDINATION
CM reviewed chart. Pt is from home with her son, DIL, and grandchildren. The pt is independent of her ADLs and has a cane at home. No other DME listed. The pt has Eden Reis and PCP. [facility].. Pt is walking 50ft with the RN per flow sheets this admission. No CM needs id'd at this time. CM following. Discharge home with family. No needs. 12/04/23 0861   Service Assessment   Patient Orientation Alert and Oriented   Cognition Alert   History Provided By Medical Record   Primary Caregiver Self   Support Systems Children;Family Members   Patient's Healthcare Decision Maker is: Legal Next of Kin   PCP Verified by CM Yes   Prior Functional Level Independent in ADLs/IADLs   Current Functional Level Independent in ADLs/IADLs   Can patient return to prior living arrangement Yes   Ability to make needs known: Good   Family able to assist with home care needs: Yes   Would you like for me to discuss the discharge plan with any other family members/significant others, and if so, who?  No   Financial Resources [facility] ([facility])   Community Resources None   Social/Functional History   Lives With [facility]

## 2023-12-04 NOTE — PROGRESS NOTES
V2.0  [facility] Hospitalist Progress Note      Name:  Dat Solitario /Age/Sex: 1960  (66 y.o. female)   MRN & CSN:  [id number] & [id number] Encounter Date/Time: 2023 3:03 PM EST    Location:  [address] [place] [address] PCP: Taylor Campbell Day: 2    Assessment and Plan:   Dat Solitario is a 66 y.o. female with pmh as noted below who presents with Hypokalemia      Plan:   Acute on chronic hypokalemia  Premature ventricular complexes   - Endorsed generalized weakness, emesis and loose stools (2 BM's daily). Chronic issue with recent admission for similar presentation.   - Moderate. Repleted, follow-up repeat labs. Continue Toprol-XL. COVID-19 infection  - Endorse non-productive cough for x 3 days but no shortness of breath,  fevers,   - On RA, steroids not indicated. - Keep in isolation, likely until 12/10. Supportive care     Elevated lactic acid  - No source of bacterial infection.  - Improving with IVF. - Antibiotics not indicated. CKD3a  - Overall stable, avoid nephrotoxic medications.   - Received IV contrast in ED. Non-MI troponin elevation  - Denied any CP on exam. Negative  LHC in 2017. - Initial Tn mildly elevated, repeat flat. ECG without acute ischemic changes. - Likely secondary to above. Hx of AVR  - Continue ASA and Toprol-XL. CML  - Continue Gleevex. Diet ADULT DIET; Regular   DVT Prophylaxis [] Lovenox, []  Heparin, [] SCDs, [] Ambulation,  [] Eliquis, [] Xarelto  [] Coumadin   Code Status Full Code   Disposition From: Home  Expected Disposition: Home  Estimated Date of Discharge: 1 day  Patient requires continued admission due to Hypokalemia   Surrogate Decision Maker/ POA self     Subjective:     Chief Complaint: Generalized Body Aches     Patient seen and examined today. No acute events noted overnight. Denies CP, SOB, N/V/D/Abd pain. Cough improving, on R. Denies further needs at this time. Plan of care discussed.     Review of Systems:    As noted Urine NEGATIVE NEGATIVE    pH, Urine 6.5 5.0 - 8.0    Protein, UA NEGATIVE NEGATIVE MG/DL    Urobilinogen, Urine 0.2 0.2 - 1.0 MG/DL    Nitrite Urine, Quantitative NEGATIVE NEGATIVE    Leukocyte Esterase, Urine NEGATIVE NEGATIVE    Urinalysis Comments       Microscopic exam not performed based on chemical results. Troponin    Collection Time: 12/03/23  6:31 PM   Result Value Ref Range    Troponin, High Sensitivity 33 (H) 0 - 14 ng/L   Magnesium    Collection Time: 12/03/23  6:31 PM   Result Value Ref Range    Magnesium 2.8 (H) 1.8 - 2.4 mg/dl   Lipase    Collection Time: 12/03/23  6:31 PM   Result Value Ref Range    Lipase 23 13 - 60 IU/L   Lactic Acid    Collection Time: 12/03/23  6:31 PM   Result Value Ref Range    Lactate 3.1 (HH) 0.5 - 1.9 mMOL/L   Clostridium Difficile Toxin/Antigen    Collection Time: 12/03/23 11:00 PM    Specimen: Stool   Result Value Ref Range    Source STOOL     C DIFF AG + TOXIN  NEGATIVE: NO C. difficile antigen and toxin detected. NEGATIVE: NO C. difficile antigen and toxin detected.    Basic Metabolic Panel w/ Reflex to MG    Collection Time: 12/04/23 12:32 AM   Result Value Ref Range    Sodium 139 135 - 145 MMOL/L    Potassium 2.9 (LL) 3.5 - 5.1 MMOL/L    Chloride 106 99 - 110 mMol/L    CO2 23 21 - 32 MMOL/L    Anion Gap 10 4 - 16    Glucose 108 (H) 70 - 99 MG/DL    BUN 10 6 - 23 MG/DL    Creatinine 1.2 (H) 0.6 - 1.1 MG/DL    Est, Glom Filt Rate 52 (L) >60 mL/min/1.73m2    Calcium 7.4 (L) 8.3 - 10.6 MG/DL   CBC with Auto Differential    Collection Time: 12/04/23 12:32 AM   Result Value Ref Range    WBC 3.5 (L) 4.0 - 10.5 K/CU MM    RBC 3.63 (L) 4.2 - 5.4 M/CU MM    Hemoglobin 11.6 (L) 12.5 - 16.0 GM/DL    Hematocrit 33.7 (L) 37 - 47 %    MCV 92.8 78 - 100 FL    MCH 32.0 (H) 27 - 31 PG    MCHC 34.4 32.0 - 36.0 %    RDW 14.2 11.7 - 14.9 %    Platelets 808 537 - 823 K/CU MM    MPV 10.7 7.5 - 11.1 FL    Differential Type AUTOMATED DIFFERENTIAL     Segs Relative 38.2 36 - 66 %

## 2023-12-04 NOTE — PROGRESS NOTES
Received call from lab for a critical lactic of 2.1. Physician notified.   Leia Lr, RN   12:09 PM  12/4/2023

## 2023-12-04 NOTE — PLAN OF CARE
Problem: Discharge Planning  Goal: Discharge to home or other facility with appropriate resources  Outcome: Progressing     Problem: Risk for Elopement  Goal: Patient will not exit the unit/facility without proper excort  Outcome: Progressing     Problem: Safety - Adult  Goal: Free from fall injury  Outcome: Progressing

## 2023-12-04 NOTE — ED NOTES
ED TO INPATIENT SBAR HANDOFF    Patient Name: Lawyer Prasad   :  1960  63 y.o. Preferred Name  Franca Villafuerte  Family/Caregiver Present yes   Restraints no   C-SSRS: Risk of Suicide: No Risk  Sitter no   Sepsis Risk Score Sepsis Risk Score: 4.19      Situation  Chief Complaint   Patient presents with    Generalized Body Aches     Brief Description of Patient's Condition: Patient came in for body aches. Hx of cancer. Sepsis alert. Mental Status: oriented, alert, and coherent  Arrived from: home    Imaging:   CT ABDOMEN PELVIS W IV CONTRAST Additional Contrast? None   Final Result   Findings suggest chronic or recurrent colitis. Diffuse infiltration of the liver         CTA PULMONARY W CONTRAST   Final Result   No evidence of pulmonary embolism      Stable patchy fibrotic changes seen in the right lung. Fatty infiltration of the liver         XR CHEST PORTABLE   Final Result   No active cardiopulmonary disease.            Abnormal labs:   Abnormal Labs Reviewed   RESPIRATORY PANEL, MOLECULAR, WITH COVID-19 - Abnormal; Notable for the following components:       Result Value    SARS-CoV-2 DETECTED BY PCR (*)     All other components within normal limits   CBC WITH AUTO DIFFERENTIAL - Abnormal; Notable for the following components:    WBC 3.5 (*)     MCH 31.1 (*)     Lymphocytes % 45.8 (*)     Monocytes % 15.3 (*)     All other components within normal limits   COMPREHENSIVE METABOLIC PANEL - Abnormal; Notable for the following components:    Potassium 2.6 (*)     Chloride 98 (*)     Anion Gap 18 (*)     Glucose 118 (*)     Creatinine 1.3 (*)     Est, Glom Filt Rate 47 (*)     All other components within normal limits   LACTATE, SEPSIS - Abnormal; Notable for the following components:    Lactic Acid, Sepsis 4.5 (*)     All other components within normal limits   TROPONIN - Abnormal; Notable for the following components:    Troponin, High Sensitivity 27 (*)     All other components within normal limits   BRAIN NATRIURETIC PEPTIDE - Abnormal; Notable for the following components:    Pro-BNP 2,378 (*)     All other components within normal limits   BLOOD GAS, VENOUS - Abnormal; Notable for the following components:    pH, Jonah 7.57 (*)     pCO2, Jonah 30 (*)     pO2, Jonah 26 (*)     Base Exc, Mixed 5.9 (*)     HCO3, Venous 27.5 (*)     All other components within normal limits   D-DIMER, RAPID - Abnormal; Notable for the following components:    D-Dimer, Quant 1.25 (*)     All other components within normal limits       Background  History:   Past Medical History:   Diagnosis Date    Aortic valve disease     CML (chronic myelocytic leukemia) (HCC)     H/O cardiac catheterization 08/04/2017    normal arteries, severe AS    H/O echocardiogram 05/2018    EF50-55% prostetic AV, mild Mr, trace-mild TR    Leukemia ([address])     Leukemia ([address])     Lung mass     Ovarian cancer ([address])        Assessment    Vitals:        Vitals:    12/03/23 1715 12/03/23 1815 12/03/23 1830 12/03/23 1856   BP:  (!) 180/79 100/68    Pulse: 97 (!) 115 99 98   Resp: 17 24 19 15   Temp:       TempSrc:       SpO2:       Weight:         PO Status: Regular  O2 Flow Rate:      Cardiac Rhythm: NSR  Last documented pain medication administered: toradol 1623  NIH Score: NIH     Active LDA's:   Peripheral IV 12/03/23 Proximal;Right Antecubital (Active)       Pertinent or High Risk Medications/Drips: no   If Yes, please provide details: none  Blood Product Administration: no  If Yes, please provide details: none    Recommendation    Incomplete orders none  Additional Comments: none   If any further questions, please call Sending RN at [employer]    Electronically signed by: Electronically signed by Win Olmstead RN on 12/3/2023 at 7:11 PM      Win Olmstead RN  12/03/23 1913

## 2023-12-05 VITALS
TEMPERATURE: 97.8 F | RESPIRATION RATE: 18 BRPM | DIASTOLIC BLOOD PRESSURE: 102 MMHG | BODY MASS INDEX: 32.6 KG/M2 | HEART RATE: 102 BPM | SYSTOLIC BLOOD PRESSURE: 145 MMHG | OXYGEN SATURATION: 98 % | WEIGHT: 202.82 LBS | HEIGHT: 66 IN

## 2023-12-05 LAB
ALBUMIN SERPL-MCNC: 3.2 GM/DL (ref 3.4–5)
ALP BLD-CCNC: 85 IU/L (ref 40–128)
ALT SERPL-CCNC: 15 U/L (ref 10–40)
ANION GAP SERPL CALCULATED.3IONS-SCNC: 5 MMOL/L (ref 4–16)
AST SERPL-CCNC: 17 IU/L (ref 15–37)
BASOPHILS ABSOLUTE: 0 K/CU MM
BASOPHILS RELATIVE PERCENT: 0.8 % (ref 0–1)
BILIRUB SERPL-MCNC: 0.2 MG/DL (ref 0–1)
BUN SERPL-MCNC: 7 MG/DL (ref 6–23)
CALCIUM SERPL-MCNC: 7.7 MG/DL (ref 8.3–10.6)
CHLORIDE BLD-SCNC: 113 MMOL/L (ref 99–110)
CO2: 25 MMOL/L (ref 21–32)
CREAT SERPL-MCNC: 1.1 MG/DL (ref 0.6–1.1)
DIFFERENTIAL TYPE: ABNORMAL
EOSINOPHILS ABSOLUTE: 0.2 K/CU MM
EOSINOPHILS RELATIVE PERCENT: 6.8 % (ref 0–3)
GFR SERPL CREATININE-BSD FRML MDRD: 57 ML/MIN/1.73M2
GLUCOSE SERPL-MCNC: 100 MG/DL (ref 70–99)
HCT VFR BLD CALC: 32.6 % (ref 37–47)
HEMOGLOBIN: 10.9 GM/DL (ref 12.5–16)
IMMATURE NEUTROPHIL %: 0 % (ref 0–0.43)
LYMPHOCYTES ABSOLUTE: 1.7 K/CU MM
LYMPHOCYTES RELATIVE PERCENT: 48.5 % (ref 24–44)
MAGNESIUM: 2.2 MG/DL (ref 1.8–2.4)
MCH RBC QN AUTO: 31.5 PG (ref 27–31)
MCHC RBC AUTO-ENTMCNC: 33.4 % (ref 32–36)
MCV RBC AUTO: 94.2 FL (ref 78–100)
MONOCYTES ABSOLUTE: 0.5 K/CU MM
MONOCYTES RELATIVE PERCENT: 13 % (ref 0–4)
NUCLEATED RBC %: 0 %
PDW BLD-RTO: 14.9 % (ref 11.7–14.9)
PLATELET # BLD: 132 K/CU MM (ref 140–440)
PMV BLD AUTO: 10.3 FL (ref 7.5–11.1)
POTASSIUM SERPL-SCNC: 3.4 MMOL/L (ref 3.5–5.1)
POTASSIUM SERPL-SCNC: 3.5 MMOL/L (ref 3.5–5.1)
RBC # BLD: 3.46 M/CU MM (ref 4.2–5.4)
SEGMENTED NEUTROPHILS ABSOLUTE COUNT: 1.1 K/CU MM
SEGMENTED NEUTROPHILS RELATIVE PERCENT: 30.9 % (ref 36–66)
SODIUM BLD-SCNC: 143 MMOL/L (ref 135–145)
TOTAL IMMATURE NEUTOROPHIL: 0 K/CU MM
TOTAL NUCLEATED RBC: 0 K/CU MM
TOTAL PROTEIN: 5 GM/DL (ref 6.4–8.2)
WBC # BLD: 3.6 K/CU MM (ref 4–10.5)

## 2023-12-05 PROCEDURE — 94640 AIRWAY INHALATION TREATMENT: CPT

## 2023-12-05 PROCEDURE — 87081 CULTURE SCREEN ONLY: CPT

## 2023-12-05 PROCEDURE — 85025 COMPLETE CBC W/AUTO DIFF WBC: CPT

## 2023-12-05 PROCEDURE — 83735 ASSAY OF MAGNESIUM: CPT

## 2023-12-05 PROCEDURE — 6370000000 HC RX 637 (ALT 250 FOR IP): Performed by: STUDENT IN AN ORGANIZED HEALTH CARE EDUCATION/TRAINING PROGRAM

## 2023-12-05 PROCEDURE — 2580000003 HC RX 258: Performed by: STUDENT IN AN ORGANIZED HEALTH CARE EDUCATION/TRAINING PROGRAM

## 2023-12-05 PROCEDURE — 6360000002 HC RX W HCPCS

## 2023-12-05 PROCEDURE — 94761 N-INVAS EAR/PLS OXIMETRY MLT: CPT

## 2023-12-05 PROCEDURE — 87430 STREP A AG IA: CPT

## 2023-12-05 PROCEDURE — 1200000000 HC SEMI PRIVATE

## 2023-12-05 PROCEDURE — 36415 COLL VENOUS BLD VENIPUNCTURE: CPT

## 2023-12-05 PROCEDURE — 6360000002 HC RX W HCPCS: Performed by: STUDENT IN AN ORGANIZED HEALTH CARE EDUCATION/TRAINING PROGRAM

## 2023-12-05 PROCEDURE — 84132 ASSAY OF SERUM POTASSIUM: CPT

## 2023-12-05 PROCEDURE — 80053 COMPREHEN METABOLIC PANEL: CPT

## 2023-12-05 RX ORDER — KETOROLAC TROMETHAMINE 30 MG/ML
15 INJECTION, SOLUTION INTRAMUSCULAR; INTRAVENOUS ONCE
Status: COMPLETED | OUTPATIENT
Start: 2023-12-05 | End: 2023-12-05

## 2023-12-05 RX ORDER — POTASSIUM CHLORIDE 20 MEQ/1
20 TABLET, EXTENDED RELEASE ORAL 2 TIMES DAILY
Qty: 14 TABLET | Refills: 0 | Status: SHIPPED | OUTPATIENT
Start: 2023-12-05 | End: 2023-12-05 | Stop reason: SDUPTHER

## 2023-12-05 RX ORDER — POTASSIUM CHLORIDE 20 MEQ/1
20 TABLET, EXTENDED RELEASE ORAL 2 TIMES DAILY
Qty: 14 TABLET | Refills: 0 | Status: SHIPPED | OUTPATIENT
Start: 2023-12-05 | End: 2023-12-12

## 2023-12-05 RX ORDER — GUAIFENESIN/DEXTROMETHORPHAN 100-10MG/5
5 SYRUP ORAL EVERY 4 HOURS PRN
Status: DISCONTINUED | OUTPATIENT
Start: 2023-12-05 | End: 2023-12-05 | Stop reason: HOSPADM

## 2023-12-05 RX ORDER — BENZONATATE 100 MG/1
100 CAPSULE ORAL 3 TIMES DAILY PRN
Status: DISCONTINUED | OUTPATIENT
Start: 2023-12-05 | End: 2023-12-05 | Stop reason: HOSPADM

## 2023-12-05 RX ADMIN — ALBUTEROL SULFATE 2 PUFF: 90 AEROSOL, METERED RESPIRATORY (INHALATION) at 11:30

## 2023-12-05 RX ADMIN — METOPROLOL SUCCINATE 25 MG: 25 TABLET, EXTENDED RELEASE ORAL at 09:57

## 2023-12-05 RX ADMIN — GABAPENTIN 400 MG: 400 CAPSULE ORAL at 13:05

## 2023-12-05 RX ADMIN — BUSPIRONE HYDROCHLORIDE 20 MG: 5 TABLET ORAL at 09:57

## 2023-12-05 RX ADMIN — KETOROLAC TROMETHAMINE 15 MG: 30 INJECTION, SOLUTION INTRAMUSCULAR at 17:33

## 2023-12-05 RX ADMIN — ENOXAPARIN SODIUM 40 MG: 100 INJECTION SUBCUTANEOUS at 18:19

## 2023-12-05 RX ADMIN — ALBUTEROL SULFATE 2 PUFF: 90 AEROSOL, METERED RESPIRATORY (INHALATION) at 15:22

## 2023-12-05 RX ADMIN — SODIUM CHLORIDE, PRESERVATIVE FREE 10 ML: 5 INJECTION INTRAVENOUS at 09:56

## 2023-12-05 RX ADMIN — ALBUTEROL SULFATE 2 PUFF: 90 AEROSOL, METERED RESPIRATORY (INHALATION) at 08:18

## 2023-12-05 RX ADMIN — PAROXETINE 30 MG: 10 TABLET, FILM COATED ORAL at 09:57

## 2023-12-05 RX ADMIN — GABAPENTIN 400 MG: 400 CAPSULE ORAL at 09:57

## 2023-12-05 RX ADMIN — POTASSIUM CHLORIDE 20 MEQ: 1500 TABLET, EXTENDED RELEASE ORAL at 09:57

## 2023-12-05 RX ADMIN — ASPIRIN 81 MG: 81 TABLET, CHEWABLE ORAL at 09:57

## 2023-12-05 ASSESSMENT — PAIN DESCRIPTION - LOCATION: LOCATION: THROAT;BACK

## 2023-12-05 ASSESSMENT — PAIN DESCRIPTION - DESCRIPTORS: DESCRIPTORS: ACHING

## 2023-12-05 NOTE — PLAN OF CARE
Problem: Discharge Planning  Goal: Discharge to home or other facility with appropriate resources  12/4/2023 2242 by Kayley Ordaz RN  Outcome: Progressing  12/4/2023 1019 by Lenin Campbell RN  Outcome: Progressing     Problem: Risk for Elopement  Goal: Patient will not exit the unit/facility without proper excort  12/4/2023 2242 by Kayley Ordaz RN  Outcome: Progressing  12/4/2023 1019 by Lenin Campbell RN  Outcome: Progressing     Problem: Safety - Adult  Goal: Free from fall injury  12/4/2023 2242 by Kayley Ordaz RN  Outcome: Progressing  12/4/2023 1019 by Lenin Campbell RN  Outcome: Progressing

## 2023-12-05 NOTE — DISCHARGE SUMMARY
identified. No evidence of intrahepatic ductal dilatation. Spleen is normal size. The gallbladder is surgically absent. Both adrenal glands are normal.  Pancreas is normal in appearance. Small bilateral renal cysts. The kidneys are otherwise normal in size and attenuation without evidence of hydronephrosis or renal calculi. GI/Bowel: The visualized bowel and mesentery show no mass lesions. Mildly diffuse thickening of the colonic wall. Mild sigmoidal colonic diverticulosis. No evidence of diverticulitis. Pelvis: No intrapelvic mass is identified. Bladder and rectum are intact. Peritoneum/Retroperitoneum: No free fluid. No lymphadenopathy. No evidence of pneumoperitoneum. Bones/Soft Tissues: Small fat containing umbilical hernia. Degenerative changes seen in the visualized spine . No acute bony abnormalities. Findings suggest chronic or recurrent colitis. Diffuse infiltration of the liver     CTA PULMONARY W CONTRAST    Result Date: 12/3/2023  EXAMINATION: CTA OF THE CHEST 12/3/2023 2:25 pm TECHNIQUE: CTA of the chest was performed after the administration of intravenous contrast.  Multiplanar reformatted images are provided for review. MIP images are provided for review. Automated exposure control, iterative reconstruction, and/or weight based adjustment of the mA/kV was utilized to reduce the radiation dose to as low as reasonably achievable. COMPARISON: 08/03/2017 HISTORY: ORDERING SYSTEM PROVIDED HISTORY: R/O PE TECHNOLOGIST PROVIDED HISTORY: Reason for exam:->R/O PE Decision Support Exception - unselect if not a suspected or confirmed emergency medical condition->Emergency Medical Condition (MA) Reason for Exam: R/O PE, sob FINDINGS: Pulmonary Arteries: Pulmonary arteries are adequately opacified for evaluation. No evidence of intraluminal filling defect to suggest pulmonary embolism. Main pulmonary artery is normal in caliber.  Mediastinum: There are a few small nonspecific lymph nodes seen in the middle mediastinum. No suspicious lymphadenopathy. Lungs/pleura: Multiple small scattered calcified granulomas. .  Stable patchy areas of scarring in the right upper and right lower lobes. There is mild bibasilar atelectasis/fibrosis. No suspicious no pulmonary masses are noted. No pleural effusions are identified. Cardiomediastinal Structures: Aortic valve stent graft. No evidence of thoracic aortic aneurysm or dissection . Cardiac chambers are normal Upper Abdomen: Decreased density seen throughout the liver. Soft Tissues/Bones: There are hypertrophic degenerative changes in the thoracic spine. No acute osseous abnormalities. No evidence of pulmonary embolism Stable patchy fibrotic changes seen in the right lung. Fatty infiltration of the liver     XR CHEST PORTABLE    Result Date: 12/3/2023  EXAMINATION: ONE XRAY VIEW OF THE CHEST 12/3/2023 2:46 pm COMPARISON: 11/08/2022 HISTORY: ORDERING SYSTEM PROVIDED HISTORY: Sepsis TECHNOLOGIST PROVIDED HISTORY: Reason for exam:->Sepsis Reason for Exam: Sepsis FINDINGS: Metallic suture line noted adjacent to the right hilum with some adjacent scarring. The lungs appear otherwise clear. There are no pulmonary infiltrates, CHF or pneumothorax. The heart appears unremarkable. Bony thorax appears normal.     No active cardiopulmonary disease.        CBC:   Recent Labs     12/03/23  1437 12/04/23  0032 12/05/23  0108   WBC 3.5* 3.5* 3.6*   HGB 14.3 11.6* 10.9*    146 132*     BMP:    Recent Labs     12/04/23  0032 12/04/23  1100 12/05/23  0108    141 143   K 2.9* 3.1* 3.4*    106 113*   CO2 23 25 25   BUN 10 8 7   CREATININE 1.2* 1.1 1.1   GLUCOSE 108* 103* 100*     Hepatic:   Recent Labs     12/03/23  1437 12/05/23  0108   AST 28 17   ALT 24 15   BILITOT 0.4 0.2   ALKPHOS 119 85     Lipids: No results found for: \"CHOL\", \"HDL\", \"TRIG\"  Hemoglobin A1C: No results found for: \"LABA1C\"  TSH: No results found for: \"TSH\"  Troponin:   Lab Results

## 2023-12-07 LAB
CULTURE: NORMAL
Lab: NORMAL
SPECIMEN: NORMAL
STREP A DIRECT SCREEN: NEGATIVE

## 2023-12-08 LAB
CULTURE: NORMAL
CULTURE: NORMAL
Lab: NORMAL
Lab: NORMAL
SPECIMEN: NORMAL
SPECIMEN: NORMAL